# Patient Record
Sex: MALE | Race: WHITE | Employment: PART TIME | URBAN - METROPOLITAN AREA
[De-identification: names, ages, dates, MRNs, and addresses within clinical notes are randomized per-mention and may not be internally consistent; named-entity substitution may affect disease eponyms.]

---

## 2017-12-24 ENCOUNTER — OFFICE VISIT (OUTPATIENT)
Dept: URGENT CARE | Facility: URGENT CARE | Age: 37
End: 2017-12-24
Payer: COMMERCIAL

## 2017-12-24 VITALS
TEMPERATURE: 98.6 F | DIASTOLIC BLOOD PRESSURE: 96 MMHG | WEIGHT: 168 LBS | RESPIRATION RATE: 16 BRPM | HEART RATE: 80 BPM | BODY MASS INDEX: 27.12 KG/M2 | SYSTOLIC BLOOD PRESSURE: 170 MMHG

## 2017-12-24 DIAGNOSIS — I10 ESSENTIAL HYPERTENSION: ICD-10-CM

## 2017-12-24 DIAGNOSIS — B36.0 TINEA VERSICOLOR: ICD-10-CM

## 2017-12-24 DIAGNOSIS — R19.7 DIARRHEA OF PRESUMED INFECTIOUS ORIGIN: Primary | ICD-10-CM

## 2017-12-24 PROCEDURE — 99214 OFFICE O/P EST MOD 30 MIN: CPT | Performed by: INTERNAL MEDICINE

## 2017-12-24 RX ORDER — KETOCONAZOLE 20 MG/ML
SHAMPOO TOPICAL
Qty: 120 ML | Refills: 0 | Status: SHIPPED | OUTPATIENT
Start: 2017-12-24 | End: 2018-08-01

## 2017-12-24 RX ORDER — LISINOPRIL/HYDROCHLOROTHIAZIDE 10-12.5 MG
1 TABLET ORAL EVERY MORNING
Qty: 30 TABLET | Refills: 0 | Status: SHIPPED | OUTPATIENT
Start: 2017-12-24 | End: 2018-09-04 | Stop reason: DRUGHIGH

## 2017-12-24 ASSESSMENT — ENCOUNTER SYMPTOMS
DIARRHEA: 1
VOMITING: 0
FEVER: 0
DIZZINESS: 0
BLOOD IN STOOL: 0
HEADACHES: 0
FOCAL WEAKNESS: 0
MYALGIAS: 0

## 2017-12-24 NOTE — PATIENT INSTRUCTIONS
"Fluids  Easy to digest foods, soups,  Expect symptoms resolve in 1 week    blood pressure very high  Please restart blood pressure pill  Refill done 1 month  Low caffeine & Low salt diet  Walk for exercise    Recheck blood pressure in few weeks with primary   * FOOD POISONING or VIRAL GASTROENTERITIS (6yr-Adult)  FOOD POISONING may occur from 6 to 24 hours after eating food that has spoiled and lasts up to1-2 days. VIRAL GASTRO-ENTERITIS is commonly known as the \"stomach flu\" and may last 2-7 days. Symptoms of both illnesses may include vomiting, diarrhea, fever, abdominal cramping. Antibiotics are not effective, but simple home treatment will be helpful.  HOME CARE:    If symptoms are severe, rest at home for the next 24 hours.    Avoid tobacco and alcohol. These may worsen your symptoms.    If medicines for diarrhea (low dose of Immodium: one tablet a day for an adult) or vomiting were prescribed, take only as directed.   During the first 12 to 24 hours follow the diet below:    DRINKS: Sport drinks like Gatorade, soft drinks without caffeine; ginger ale, mineral water (plain or flavored), decaffeinated tea and coffee.    SOUPS: Clear broth, consommé and bouillon    DESSERTS: Plain gelatin (Jell-O), popsicles and fruit juice bars.  During the next 24 hours you may add the following to the above:    Hot cereal, plain toast, bread, rolls, crackers    Plain noodles, rice, mashed potatoes, chicken noodle or rice soup    Unsweetened canned fruit (avoid pineapple), bananas    Limit fat intake to less than 15 grams per day by avoiding margarine, butter, oils, mayonnaise, sauces, gravies, fried foods, peanut butter, meat, poultry and fish.    Limit fiber; avoid raw or cooked vegetables, fresh fruits (except bananas) and bran cereals.    Limit caffeine and chocolate. No spices or seasonings except salt.  Slowly go back to a normal diet as you feel better and your symptoms lessen.  FOLLOW UP with your doctor as advised if " you are not better in 2 days. If a stool (diarrhea) sample was taken, you may call in 2 days (or as directed) for the results.  GET PROMPT MEDICAL ATTENTION if any of the following occur:    Increasing abdominal pain or constant pain in one spot    Continued vomiting (unable to keep liquids down)    Frequent diarrhea (more than 5 times a day)    Blood in vomit or stool (black or red color)    Unable to take in fluids at all    No urine output for 12 hours or extreme thirst    Weakness, dizziness, fainting    Drowsiness, confusion, stiff neck or seizure    Fever over 101.0  F (38.3  C) for more than 3 days    New rash    0496-7079 [employer]. [address] [place] [address], [place] [address]. All rights reserved. This information is not intended as a substitute for professional medical care. Always follow your healthcare professional's instructions.  This information has been modified by your health care provider with permission from the publisher.      Tinea Versicolor  This is a rash caused by a fungus in the top layers of the skin. This fungus is normally present in the pores of the skin and causes no symptoms. But when the fungus overgrows it causes a rash. The fungus grows more easily in hot climates, and on oily or sweaty skin. Health experts don t know why some people get this rash and others don t. Experts also don t know why the rash will suddenly appear in someone who has never had it before.  The rash is made up of irregular pale or tan spots and patches. The rash is usually on the neck, upper back, chest, and shoulders. You may have mild itching, especially if you become overheated. But it doesn't cause other symptoms. Because these spots don't change color with sun exposure like normal skin, the rash may be lighter or darker than your normal skin.  This rash is harmless and usually causes no symptoms. The only reason for treatment is to improve appearance. Follow the advice below to clear the  rash. It might take several months for normal skin color to return.  Home care    Use a medicated dandruff shampoo over your whole body while in the shower. Don t use soap. Let the shampoo stay on for at least a few minutes before rinsing off. Do this every day for 4 weeks.    As a different treatment, you may buy an antifungal cream (miconazole or clotrimazole, both available without a prescription). Use this 2 times a day for 7 days.     This rash is not contagious to others. It can t be spread if someone touches it. So you don t have to worry about exposing others at school, , or work.  Prevention  This fungus can come back again (recur) after treatment. To prevent return of the rash, use medicated dandruff shampoo over your whole body when in the shower. Do this once a month for the next year. This is very important to do in the summertime. That is when the rash is most likely to recur.  Other prevention tips include:    Avoid oily skin products    Wear loose clothing. Try to let your skin stay cool and breathe.    Use sunscreen and protect yourself from sunlight    Avoid tanning beds  Follow-up care  Follow up with your healthcare provider, or as advised. Call your provider if the rash doesn t get better with the above treatment, or if new symptoms appear.  When to seek medical advice  Call your healthcare provider right away if any of these occur:    Increasing redness of the rash    Change in appearance of the rash    Fever of 100.4 F (38 C) or higher, or as directed by your provider  Date Last Reviewed: 8/1/2016 2000-2017 [employer]. [address] [place] [address], [place] [address]. All rights reserved. This information is not intended as a substitute for professional medical care. Always follow your healthcare professional's instructions.

## 2017-12-24 NOTE — NURSING NOTE
"Chief Complaint   Patient presents with     Abdominal Pain     states abdominal discomfort and small amt of diarrhea       Initial BP (!) 170/96 (Cuff Size: Adult Regular)  Pulse 80  Temp 98.6  F (37  C) (Oral)  Resp 16  Wt 168 lb (76.2 kg)  BMI 27.12 kg/m2 Estimated body mass index is 27.12 kg/(m^2) as calculated from the following:    Height as of 1/26/15: 5' 6\" (1.676 m).    Weight as of this encounter: 168 lb (76.2 kg).  Medication Reconciliation: complete Cooper FLETCHER    "

## 2017-12-24 NOTE — MR AVS SNAPSHOT
"              After Visit Summary   12/24/2017    Philipp Gupta    MRN: [id number]           Patient Information     Date Of Birth          1980        Visit Information        Provider Department      12/24/2017 10:30 AM Stacia Garnett MD [facility]        Today's Diagnoses     Diarrhea of presumed infectious origin    -  1    Essential hypertension        Tinea versicolor          Care Instructions    Fluids  Easy to digest foods, soups,  Expect symptoms resolve in 1 week    blood pressure very high  Please restart blood pressure pill  Refill done 1 month  Low caffeine & Low salt diet  Walk for exercise    Recheck blood pressure in few weeks with primary   * FOOD POISONING or VIRAL GASTROENTERITIS (6yr-Adult)  FOOD POISONING may occur from 6 to 24 hours after eating food that has spoiled and lasts up to1-2 days. VIRAL GASTRO-ENTERITIS is commonly known as the \"stomach flu\" and may last 2-7 days. Symptoms of both illnesses may include vomiting, diarrhea, fever, abdominal cramping. Antibiotics are not effective, but simple home treatment will be helpful.  HOME CARE:    If symptoms are severe, rest at home for the next 24 hours.    Avoid tobacco and alcohol. These may worsen your symptoms.    If medicines for diarrhea (low dose of Immodium: one tablet a day for an adult) or vomiting were prescribed, take only as directed.   During the first 12 to 24 hours follow the diet below:    DRINKS: Sport drinks like Gatorade, soft drinks without caffeine; ginger ale, mineral water (plain or flavored), decaffeinated tea and coffee.    SOUPS: Clear broth, consommé and bouillon    DESSERTS: Plain gelatin (Jell-O), popsicles and fruit juice bars.  During the next 24 hours you may add the following to the above:    Hot cereal, plain toast, bread, rolls, crackers    Plain noodles, rice, mashed potatoes, chicken noodle or rice soup    Unsweetened canned fruit (avoid pineapple), " bananas    Limit fat intake to less than 15 grams per day by avoiding margarine, butter, oils, mayonnaise, sauces, gravies, fried foods, peanut butter, meat, poultry and fish.    Limit fiber; avoid raw or cooked vegetables, fresh fruits (except bananas) and bran cereals.    Limit caffeine and chocolate. No spices or seasonings except salt.  Slowly go back to a normal diet as you feel better and your symptoms lessen.  FOLLOW UP with your doctor as advised if you are not better in 2 days. If a stool (diarrhea) sample was taken, you may call in 2 days (or as directed) for the results.  GET PROMPT MEDICAL ATTENTION if any of the following occur:    Increasing abdominal pain or constant pain in one spot    Continued vomiting (unable to keep liquids down)    Frequent diarrhea (more than 5 times a day)    Blood in vomit or stool (black or red color)    Unable to take in fluids at all    No urine output for 12 hours or extreme thirst    Weakness, dizziness, fainting    Drowsiness, confusion, stiff neck or seizure    Fever over 101.0  F (38.3  C) for more than 3 days    New rash    3597-1048 [employer]. [address] [place] [address]. All rights reserved. This information is not intended as a substitute for professional medical care. Always follow your healthcare professional's instructions.  This information has been modified by your health care provider with permission from the publisher.      Tinea Versicolor  This is a rash caused by a fungus in the top layers of the skin. This fungus is normally present in the pores of the skin and causes no symptoms. But when the fungus overgrows it causes a rash. The fungus grows more easily in hot climates, and on oily or sweaty skin. Health experts don t know why some people get this rash and others don t. Experts also don t know why the rash will suddenly appear in someone who has never had it before.  The rash is made up of irregular pale or tan spots and  patches. The rash is usually on the neck, upper back, chest, and shoulders. You may have mild itching, especially if you become overheated. But it doesn't cause other symptoms. Because these spots don't change color with sun exposure like normal skin, the rash may be lighter or darker than your normal skin.  This rash is harmless and usually causes no symptoms. The only reason for treatment is to improve appearance. Follow the advice below to clear the rash. It might take several months for normal skin color to return.  Home care    Use a medicated dandruff shampoo over your whole body while in the shower. Don t use soap. Let the shampoo stay on for at least a few minutes before rinsing off. Do this every day for 4 weeks.    As a different treatment, you may buy an antifungal cream (miconazole or clotrimazole, both available without a prescription). Use this 2 times a day for 7 days.     This rash is not contagious to others. It can t be spread if someone touches it. So you don t have to worry about exposing others at school, , or work.  Prevention  This fungus can come back again (recur) after treatment. To prevent return of the rash, use medicated dandruff shampoo over your whole body when in the shower. Do this once a month for the next year. This is very important to do in the summertime. That is when the rash is most likely to recur.  Other prevention tips include:    Avoid oily skin products    Wear loose clothing. Try to let your skin stay cool and breathe.    Use sunscreen and protect yourself from sunlight    Avoid tanning beds  Follow-up care  Follow up with your healthcare provider, or as advised. Call your provider if the rash doesn t get better with the above treatment, or if new symptoms appear.  When to seek medical advice  Call your healthcare provider right away if any of these occur:    Increasing redness of the rash    Change in appearance of the rash    Fever of 100.4 F (38 C) or higher,  "or as directed by your provider  Date Last Reviewed: 2016-2017 [employer]. [address] [place] [address], [place] [address]. All rights reserved. This information is not intended as a substitute for professional medical care. Always follow your healthcare professional's instructions.                Follow-ups after your visit        Who to contact     If you have questions or need follow up information about today's clinic visit or your schedule please contact [facility] directly at [phone].  Normal or non-critical lab and imaging results will be communicated to you by [employer], letter or phone within 4 business days after the clinic has received the results. If you do not hear from us within 7 days, please contact the clinic through [employer] or phone. If you have a critical or abnormal lab result, we will notify you by phone as soon as possible.  Submit refill requests through [employer] or call your pharmacy and they will forward the refill request to us. Please allow 3 business days for your refill to be completed.          Additional Information About Your Visit        [employer] Information     [employer] lets you send messages to your doctor, view your test results, renew your prescriptions, schedule appointments and more. To sign up, go to [email or web address] . Click on \"Log in\" on the left side of the screen, which will take you to the Welcome page. Then click on \"Sign up Now\" on the right side of the page.     You will be asked to enter the access code listed below, as well as some personal information. Please follow the directions to create your username and password.     Your access code is: [email or web address]  Expires: 3/24/2018 12:49 PM     Your access code will  in 90 days. If you need help or a new code, please call your [facility] clinic or [phone].        Care EveryWhere ID     This is your Care EveryWhere ID. This could be used by other " organizations to access your [facility] medical records  [id number]        Your Vitals Were     Pulse Temperature Respirations BMI (Body Mass Index)          80 98.6  F (37  C) (Oral) 16 27.12 kg/m2         Blood Pressure from Last 3 Encounters:   12/24/17 (!) 170/96   11/17/16 (!) 158/104   01/26/15 132/88    Weight from Last 3 Encounters:   12/24/17 168 lb (76.2 kg)   11/17/16 168 lb (76.2 kg)   02/24/14 159 lb (72.1 kg)              Today, you had the following     No orders found for display         Today's Medication Changes          These changes are accurate as of: 12/24/17 12:54 PM.  If you have any questions, ask your nurse or doctor.               Start taking these medicines.        Dose/Directions    ketoconazole 2 % shampoo   Commonly known as:  NIZORAL   Used for:  Tinea versicolor   Started by:  Stacia Garnett MD        Apply to the affected area and wash off after 5 -10 minutes.   Quantity:  120 mL   Refills:  0            Where to get your medicines      These medications were sent to [facility] [address] [address]     Phone:  [phone]     ketoconazole 2 % shampoo    lisinopril-hydrochlorothiazide 10-12.5 MG per tablet                Primary Care Provider Office Phone # Fax #    Corey Cosme [phone]       XXX RESIGNED  E NICOLLET [facility] [address]  [facility] [address]        Equal Access to Services     RENITA VERA : Hadii adam ku hadasho Soomaali, waaxda luqadaha, qaybta kaalmada adeegyaseverino, renato idiin hayaan adeeg kharash la'aan . So [facility] [phone].    ATENCIÓN: Si habla español, tiene a murphy disposición servicios gratuitos de asistencia lingüística. Llame al [phone].    We comply with applicable federal civil rights laws and [place] laws. We do not discriminate on the basis of race, color, national origin, age, disability, sex, sexual orientation, or gender identity.             Thank you!     Thank you for choosing [facility]  for your care. Our goal is always to provide you with excellent care. Hearing back from our patients is one way we can continue to improve our services. Please take a few minutes to complete the written survey that you may receive in the mail after your visit with us. Thank you!             Your Updated Medication List - Protect others around you: Learn how to safely use, store and throw away your medicines at www.disposemymeds.org.          This list is accurate as of: 12/24/17 12:54 PM.  Always use your most recent med list.                   Brand Name Dispense Instructions for use Diagnosis    ketoconazole 2 % shampoo    NIZORAL    120 mL    Apply to the affected area and wash off after 5 -10 minutes.    Tinea versicolor       lisinopril-hydrochlorothiazide 10-12.5 MG per tablet    PRINZIDE/ZESTORETIC    30 tablet    Take 1 tablet by mouth every morning    Essential hypertension       order for DME     1 each    Equipment being ordered: Heel support, gel, lateral spot, medium    Plantar fascial fibromatosis, Pes cavus

## 2017-12-24 NOTE — PROGRESS NOTES
SUBJECTIVE:   Philipp Gupta is a 37 year old male presenting with a chief complaint of   Chief Complaint   Patient presents with     Abdominal Pain     states abdominal discomfort and small amt of diarrhea   .  started yesterday  May be related to eating food yesterday at restaurant  No contagious contacts  No recent travel or antibiotics     abd making sounds  No abd pain    Loose watery stools  1-2 day.    Review of Systems   Constitutional: Negative for fever.   Cardiovascular: Negative for chest pain.   Gastrointestinal: Positive for diarrhea. Negative for blood in stool, melena and vomiting.   Musculoskeletal: Negative for myalgias.   Neurological: Negative for dizziness, focal weakness and headaches.     Also, he has additional complaints of rash on chest/fungus.     No past medical history on file.  Current Outpatient Prescriptions   Medication Sig Dispense Refill     lisinopril-hydrochlorothiazide (PRINZIDE/ZESTORETIC) 10-12.5 MG per tablet Take 1 tablet by mouth every morning 30 tablet 0     ketoconazole (NIZORAL) 2 % shampoo Apply to the affected area and wash off after 5 -10 minutes. 120 mL 0     ORDER FOR DME Equipment being ordered: Heel support, gel, lateral spot, medium 1 each 0     [DISCONTINUED] lisinopril-hydrochlorothiazide (PRINZIDE,ZESTORETIC) 10-12.5 MG per tablet Take 1 tablet by mouth every morning. 90 tablet 3     Social History   Substance Use Topics     Smoking status: Never Smoker     Smokeless tobacco: Never Used     Alcohol use No       OBJECTIVE  BP (!) 170/96 (Cuff Size: Adult Regular)  Pulse 80  Temp 98.6  F (37  C) (Oral)  Resp 16  Wt 168 lb (76.2 kg)  BMI 27.12 kg/m2    Physical Exam   Constitutional: He is well-developed, well-nourished, and in no distress.   Cardiovascular: Normal rate and regular rhythm.    Pulmonary/Chest: Effort normal and breath sounds normal.   Abdominal: Soft. Bowel sounds are normal. There is no tenderness. There is no rebound and no guarding.  "  Skin:   Circular/coalescing Hypopigmented areas on chest & back       Labs:  No results found for this or any previous visit (from the past 24 hour(s)).    ASSESSMENT:      ICD-10-CM    1. Diarrhea of presumed infectious origin A09    2. Essential hypertension I10 lisinopril-hydrochlorothiazide (PRINZIDE/ZESTORETIC) 10-12.5 MG per tablet   3. Tinea versicolor B36.0 ketoconazole (NIZORAL) 2 % shampoo        Medical Decision Making:      Patient Instructions   Fluids  Easy to digest foods, soups,  Expect symptoms resolve in 1 week    blood pressure very high  Please restart blood pressure pill  Refill done 1 month  Low caffeine & Low salt diet  Walk for exercise    Recheck blood pressure in few weeks with primary   * FOOD POISONING or VIRAL GASTROENTERITIS (6yr-Adult)  FOOD POISONING may occur from 6 to 24 hours after eating food that has spoiled and lasts up to1-2 days. VIRAL GASTRO-ENTERITIS is commonly known as the \"stomach flu\" and may last 2-7 days. Symptoms of both illnesses may include vomiting, diarrhea, fever, abdominal cramping. Antibiotics are not effective, but simple home treatment will be helpful.  HOME CARE:    If symptoms are severe, rest at home for the next 24 hours.    Avoid tobacco and alcohol. These may worsen your symptoms.    If medicines for diarrhea (low dose of Immodium: one tablet a day for an adult) or vomiting were prescribed, take only as directed.   During the first 12 to 24 hours follow the diet below:    DRINKS: Sport drinks like Gatorade, soft drinks without caffeine; ginger ale, mineral water (plain or flavored), decaffeinated tea and coffee.    SOUPS: Clear broth, consommé and bouillon    DESSERTS: Plain gelatin (Jell-O), popsicles and fruit juice bars.  During the next 24 hours you may add the following to the above:    Hot cereal, plain toast, bread, rolls, crackers    Plain noodles, rice, mashed potatoes, chicken noodle or rice soup    Unsweetened canned fruit (avoid " pineapple), bananas    Limit fat intake to less than 15 grams per day by avoiding margarine, butter, oils, mayonnaise, sauces, gravies, fried foods, peanut butter, meat, poultry and fish.    Limit fiber; avoid raw or cooked vegetables, fresh fruits (except bananas) and bran cereals.    Limit caffeine and chocolate. No spices or seasonings except salt.  Slowly go back to a normal diet as you feel better and your symptoms lessen.  FOLLOW UP with your doctor as advised if you are not better in 2 days. If a stool (diarrhea) sample was taken, you may call in 2 days (or as directed) for the results.  GET PROMPT MEDICAL ATTENTION if any of the following occur:    Increasing abdominal pain or constant pain in one spot    Continued vomiting (unable to keep liquids down)    Frequent diarrhea (more than 5 times a day)    Blood in vomit or stool (black or red color)    Unable to take in fluids at all    No urine output for 12 hours or extreme thirst    Weakness, dizziness, fainting    Drowsiness, confusion, stiff neck or seizure    Fever over 101.0  F (38.3  C) for more than 3 days    New rash    3833-1954 [employer]. [address] [place] [address]. All rights reserved. This information is not intended as a substitute for professional medical care. Always follow your healthcare professional's instructions.  This information has been modified by your health care provider with permission from the publisher.      Tinea Versicolor  This is a rash caused by a fungus in the top layers of the skin. This fungus is normally present in the pores of the skin and causes no symptoms. But when the fungus overgrows it causes a rash. The fungus grows more easily in hot climates, and on oily or sweaty skin. Health experts don t know why some people get this rash and others don t. Experts also don t know why the rash will suddenly appear in someone who has never had it before.  The rash is made up of irregular pale or  tan spots and patches. The rash is usually on the neck, upper back, chest, and shoulders. You may have mild itching, especially if you become overheated. But it doesn't cause other symptoms. Because these spots don't change color with sun exposure like normal skin, the rash may be lighter or darker than your normal skin.  This rash is harmless and usually causes no symptoms. The only reason for treatment is to improve appearance. Follow the advice below to clear the rash. It might take several months for normal skin color to return.  Home care    Use a medicated dandruff shampoo over your whole body while in the shower. Don t use soap. Let the shampoo stay on for at least a few minutes before rinsing off. Do this every day for 4 weeks.    As a different treatment, you may buy an antifungal cream (miconazole or clotrimazole, both available without a prescription). Use this 2 times a day for 7 days.     This rash is not contagious to others. It can t be spread if someone touches it. So you don t have to worry about exposing others at school, , or work.  Prevention  This fungus can come back again (recur) after treatment. To prevent return of the rash, use medicated dandruff shampoo over your whole body when in the shower. Do this once a month for the next year. This is very important to do in the summertime. That is when the rash is most likely to recur.  Other prevention tips include:    Avoid oily skin products    Wear loose clothing. Try to let your skin stay cool and breathe.    Use sunscreen and protect yourself from sunlight    Avoid tanning beds  Follow-up care  Follow up with your healthcare provider, or as advised. Call your provider if the rash doesn t get better with the above treatment, or if new symptoms appear.  When to seek medical advice  Call your healthcare provider right away if any of these occur:    Increasing redness of the rash    Change in appearance of the rash    Fever of 100.4 F  (38 C) or higher, or as directed by your provider  Date Last Reviewed: 8/1/2016 2000-2017 [employer]. [address] [place] [address], [place] [address]. All rights reserved. This information is not intended as a substitute for professional medical care. Always follow your healthcare professional's instructions.

## 2018-08-01 ENCOUNTER — HOSPITAL ENCOUNTER (EMERGENCY)
Facility: CLINIC | Age: 38
Discharge: HOME | End: 2018-08-01
Attending: EMERGENCY MEDICINE | Admitting: EMERGENCY MEDICINE
Payer: OTHER MISCELLANEOUS

## 2018-08-01 ENCOUNTER — APPOINTMENT (OUTPATIENT)
Dept: GENERAL RADIOLOGY | Facility: CLINIC | Age: 38
End: 2018-08-01
Attending: EMERGENCY MEDICINE
Payer: OTHER MISCELLANEOUS

## 2018-08-01 VITALS
BODY MASS INDEX: 25.82 KG/M2 | DIASTOLIC BLOOD PRESSURE: 101 MMHG | WEIGHT: 160 LBS | SYSTOLIC BLOOD PRESSURE: 187 MMHG | TEMPERATURE: 98 F | RESPIRATION RATE: 18 BRPM | HEART RATE: 89 BPM | OXYGEN SATURATION: 99 %

## 2018-08-01 DIAGNOSIS — M79.641 PAIN OF RIGHT HAND: ICD-10-CM

## 2018-08-01 PROCEDURE — 73130 X-RAY EXAM OF HAND: CPT | Mod: RT

## 2018-08-01 PROCEDURE — 25000132 ZZH RX MED GY IP 250 OP 250 PS 637: Performed by: EMERGENCY MEDICINE

## 2018-08-01 PROCEDURE — 99283 EMERGENCY DEPT VISIT LOW MDM: CPT

## 2018-08-01 RX ORDER — GINSENG 100 MG
CAPSULE ORAL
Status: DISCONTINUED
Start: 2018-08-01 | End: 2018-08-01 | Stop reason: HOSPADM

## 2018-08-01 RX ORDER — IBUPROFEN 600 MG/1
600 TABLET, FILM COATED ORAL ONCE
Status: COMPLETED | OUTPATIENT
Start: 2018-08-01 | End: 2018-08-01

## 2018-08-01 RX ADMIN — IBUPROFEN 600 MG: 600 TABLET ORAL at 20:10

## 2018-08-01 ASSESSMENT — ENCOUNTER SYMPTOMS: WOUND: 0

## 2018-08-01 NOTE — ED AVS SNAPSHOT
[facility] Emergency Department    [address]    [facility] [address]    Phone:  [phone]    Fax:  [phone]                                       Philipp Gupta   MRN: [id number]    Department:  [facility] Emergency Department   Date of Visit:  8/1/2018           After Visit Summary Signature Page     I have received my discharge instructions, and my questions have been answered. I have discussed any challenges I see with this plan with the nurse or doctor.    ..........................................................................................................................................  Patient/Patient Representative Signature      ..........................................................................................................................................  Patient Representative Print Name and Relationship to Patient    ..................................................               ................................................  Date                                            Time    ..........................................................................................................................................  Reviewed by Signature/Title    ...................................................              ..............................................  Date                                                            Time

## 2018-08-01 NOTE — ED AVS SNAPSHOT
[facility] Emergency Department    [address] [place] [address]    Phone:  [phone]    Fax:  [phone]                                       Philipp Gupta   MRN: [id number]    Department:  [facility] Emergency Department   Date of Visit:  8/1/2018           Patient Information     Date Of Birth          1980        Your diagnoses for this visit were:     Pain of right hand        You were seen by Phi Tapia MD.        Discharge Instructions         Please make an appointment to follow up with your primary care provider in 7 days for recheck of your blood pressure.    Discharge Instructions  Hypertension - High Blood Pressure    During you visit to the Emergency Department, your blood pressure was higher than the recommended blood pressure.  This may be related to stress, pain, medication or other temporary conditions. In these cases, your blood pressure may return to normal on its own. If you have a history of high blood pressure, you may need to have your provider adjust your medications. Sometimes, your high measurement here may indicate that you have developed high blood pressure that will stay high unless it is treated. As a general rule, high blood pressure causes problems over years rather than days, weeks, or months. So, while it is important to treat blood pressure, it is rarely important to treat blood pressure immediately. Occasionally we will begin a medication in the Emergency Department; more often we will recommend close follow-up for medications with a primary doctor/clinic.    Generally, every Emergency Department visit should have a follow-up clinic visit with either a primary or a specialty clinic/provider. Please follow-up as instructed by your emergency provider today.    Return to the Emergency Department if you start to have:    A severe headache.    Chest pain.    Shortness of breath.    Weakness or numbness that affects  one part of the body.    Confusion.    Vision changes.    Significant swelling of legs and/or eyes.    A reaction to any medication started in the Emergency Department.    What can I do to help myself?    Avoid alcohol.    Take any blood pressure medicine that you are prescribed.    Get a good night s sleep.    Lower your salt intake.    Exercise.    Lose weight.    Manage stress.    See your doctor regularly    If blood pressure medication was started in the Emergency Department:    The medicine may not have an immediate effect. The body and brain determine what blood pressure you have. The medicine s job is to retrain the body s  thermostat  to a lower blood pressure.    You will need to follow up with your provider to see how this medicine is working for you.  If you were given a prescription for medicine here today, be sure to read all of the information (including the package insert) that comes with your prescription.  This will include important information about the medicine, its side effects, and any warnings that you need to know about.  The pharmacist who fills the prescription can provide more information and answer questions you may have about the medicine.  If you have questions or concerns that the pharmacist cannot address, please call or return to the Emergency Department.   Remember that you can always come back to the Emergency Department if you are not able to see your regular provider in the amount of time listed above, if you get any new symptoms, or if there is anything that worries you.      Discharge References/Attachments     HAND CONTUSION (ENGLISH)      24 Hour Appointment Hotline       To make an appointment at any [facility], call [phone] ([phone]). If you don't have a family doctor or clinic, we will help you find one. [facility] are conveniently located to serve the needs of you and your family.             Review of your medicines      Our records show that  you are taking the medicines listed below. If these are incorrect, please call your family doctor or clinic.        Dose / Directions Last dose taken    lisinopril-hydrochlorothiazide 10-12.5 MG per tablet   Commonly known as:  PRINZIDE/ZESTORETIC   Dose:  1 tablet   Quantity:  30 tablet        Take 1 tablet by mouth every morning   Refills:  0        order for DME   Quantity:  1 each        Equipment being ordered: Heel support, gel, lateral spot, medium   Refills:  0                Procedures and tests performed during your visit     XR Hand Right G/E 3 Views      Orders Needing Specimen Collection     None      Pending Results     No orders found from 7/30/2018 to 8/2/2018.            Pending Culture Results     No orders found from 7/30/2018 to 8/2/2018.            Pending Results Instructions     If you had any lab results that were not finalized at the time of your Discharge, you can call the ED Lab Result RN at [phone]. You will be contacted by this team for any positive Lab results or changes in treatment. The nurses are available 7 days a week from 10A to 6:30P.  You can leave a message 24 hours per day and they will return your call.        Test Results From Your Hospital Stay        8/1/2018  8:35 PM      Narrative     HAND THREE  VIEWS RIGHT 8/1/2018 8:17 PM     HISTORY: traumatic 1st metacarpal pain; Possible puncture wound - rule  out FB;     COMPARISON: None.        Impression     IMPRESSION: No acute fracture, dislocation or radiopaque foreign body.    BRADLEY ABRAMS MD                Clinical Quality Measure: Blood Pressure Screening     Your blood pressure was checked while you were in the emergency department today. The last reading we obtained was  BP: (!) 187/101 . Please read the guidelines below about what these numbers mean and what you should do about them.  If your systolic blood pressure (the top number) is less than 120 and your diastolic blood pressure (the bottom number) is less  "than 80, then your blood pressure is normal. There is nothing more that you need to do about it.  If your systolic blood pressure (the top number) is 120-139 or your diastolic blood pressure (the bottom number) is 80-89, your blood pressure may be higher than it should be. You should have your blood pressure rechecked within a year by a primary care provider.  If your systolic blood pressure (the top number) is 140 or greater or your diastolic blood pressure (the bottom number) is 90 or greater, you may have high blood pressure. High blood pressure is treatable, but if left untreated over time it can put you at risk for heart attack, stroke, or kidney failure. You should have your blood pressure rechecked by a primary care provider within the next 4 weeks.  If your provider in the emergency department today gave you specific instructions to follow-up with your doctor or provider even sooner than that, you should follow that instruction and not wait for up to 4 weeks for your follow-up visit.        Thank you for choosing [facility]       Thank you for choosing [facility] for your care. Our goal is always to provide you with excellent care. Hearing back from our patients is one way we can continue to improve our services. Please take a few minutes to complete the written survey that you may receive in the mail after you visit with us. Thank you!        [employer] Information     [employer] lets you send messages to your doctor, view your test results, renew your prescriptions, schedule appointments and more. To sign up, go to [email or web address] . Click on \"Log in\" on the left side of the screen, which will take you to the Welcome page. Then click on \"Sign up Now\" on the right side of the page.     You will be asked to enter the access code listed below, as well as some personal information. Please follow the directions to create your username and password.     Your access code is: [email or web address]  Expires: 10/30/2018  8:44 " PM     Your access code will  in 90 days. If you need help or a new code, please call your [facility] clinic or [phone].        Care EveryWhere ID     This is your Care EveryWhere ID. This could be used by other organizations to access your [facility] medical records  [id number]        Equal Access to Services     BRAEDEN EVRA : Bruno Flowers, waaxda angeladaha, qaybta kaalnaveed coreas, renato pride. So [facility] [phone].    ATENCIÓN: Si habla español, tiene a murphy disposición servicios gratuitos de asistencia lingüística. Llame al [phone].    We comply with applicable federal civil rights laws and [place] laws. We do not discriminate on the basis of race, color, national origin, age, disability, sex, sexual orientation, or gender identity.            After Visit Summary       This is your record. Keep this with you and show to your community pharmacist(s) and doctor(s) at your next visit.

## 2018-08-02 NOTE — DISCHARGE INSTRUCTIONS
Please make an appointment to follow up with your primary care provider in 7 days for recheck of your blood pressure.    Discharge Instructions  Hypertension - High Blood Pressure    During you visit to the Emergency Department, your blood pressure was higher than the recommended blood pressure.  This may be related to stress, pain, medication or other temporary conditions. In these cases, your blood pressure may return to normal on its own. If you have a history of high blood pressure, you may need to have your provider adjust your medications. Sometimes, your high measurement here may indicate that you have developed high blood pressure that will stay high unless it is treated. As a general rule, high blood pressure causes problems over years rather than days, weeks, or months. So, while it is important to treat blood pressure, it is rarely important to treat blood pressure immediately. Occasionally we will begin a medication in the Emergency Department; more often we will recommend close follow-up for medications with a primary doctor/clinic.    Generally, every Emergency Department visit should have a follow-up clinic visit with either a primary or a specialty clinic/provider. Please follow-up as instructed by your emergency provider today.    Return to the Emergency Department if you start to have:    A severe headache.    Chest pain.    Shortness of breath.    Weakness or numbness that affects one part of the body.    Confusion.    Vision changes.    Significant swelling of legs and/or eyes.    A reaction to any medication started in the Emergency Department.    What can I do to help myself?    Avoid alcohol.    Take any blood pressure medicine that you are prescribed.    Get a good night s sleep.    Lower your salt intake.    Exercise.    Lose weight.    Manage stress.    See your doctor regularly    If blood pressure medication was started in the Emergency Department:    The medicine may not have an  immediate effect. The body and brain determine what blood pressure you have. The medicine s job is to retrain the body s  thermostat  to a lower blood pressure.    You will need to follow up with your provider to see how this medicine is working for you.  If you were given a prescription for medicine here today, be sure to read all of the information (including the package insert) that comes with your prescription.  This will include important information about the medicine, its side effects, and any warnings that you need to know about.  The pharmacist who fills the prescription can provide more information and answer questions you may have about the medicine.  If you have questions or concerns that the pharmacist cannot address, please call or return to the Emergency Department.   Remember that you can always come back to the Emergency Department if you are not able to see your regular provider in the amount of time listed above, if you get any new symptoms, or if there is anything that worries you.

## 2018-08-02 NOTE — ED PROVIDER NOTES
History     Chief Complaint:  Hand pain    HPI   Philipp Gupta is a 38 year old male who presents with right hand pain. The patient was working as a  here in the [facility] when he grabbed a trash bag and immediatly felt a sharp pain in his right palm at the thumb. The pain has persisted and he is concerned he may have accidentally grabbed a piece of glass or needle. He has no wound and was not bleeding at any point. The pain does not radiate to any other part of his hand or wrist. He has not taken anything for the pain.    Allergies:  No known allergies     Medications:    Prinizide     Past Medical History:    Hypertension    Past Surgical History:    The patient does not have any pertinent past surgical history.    Family History:    No past pertinent family history.    Social History:  Patient presents alone  Negative for tobacco use.  Negative for alcohol use.  Marital Status:  Single     Review of Systems   Musculoskeletal:        Right palm pain   Skin: Negative for wound.   All other systems reviewed and are negative.      Physical Exam   First Vitals:  BP: (!) 187/101  Pulse: 89  Heart Rate: 89  Temp: 98  F (36.7  C)  Resp: 18  Weight: 72.6 kg (160 lb)  SpO2: 99 %      Physical Exam    General:   Pleasant, age appropriate.  EYES:   Conjunctiva normal.  NECK:    Supple, no meningismus.   CV:     Regular rate and rhythm     No murmurs, rubs or gallops.       2+ radial pulses bilateral.  PULM:    Clear to auscultation bilateral.       No respiratory distress.      No wheezing, rales or stridor.  MSK:     Right upper extremity:      No pain with palpation or ROM of the wrist.      No scaphoid tenderness.      Minimal tenderness over the volar aspect 1st MCP joint with mild overlying edema       No overlying laceration, puncture wound or tenting of the skin.  LYMPH:   No cervical lymphadenopathy.  NEURO:   right upper extremity:      Median, radial and ulnar nerve intact to motor and  sensation.  SKIN:    Warm, dry and intact.    PSYCH:    Mood is good and affect is appropriate.      Emergency Department Course   Imaging:  Radiographic findings were communicated with the patient who voiced understanding of the findings.  X-ray Right hand, 3 views:  No acute fracture, dislocation or radiopaque foreign body.  Result per radiology.      Interventions:   - Advil 600 mg PO   - Bacitracin ointment      Emergency Department Course:  Nursing notes and vitals reviewed.  : I performed an exam of the patient as documented above. Imaging ordered    : I rechecked the patient. Findings and plan explained to the Patient. Patient discharged home with instructions regarding supportive care, medications, and reasons to return. The importance of close follow-up was reviewed.       Impression & Plan    Medical Decision Makin-year-old male seen in the ED with acute onset of discomfort at his first MCP joint of his right hand while throwing away trash.  Plain films are without fracture or dislocation.  No signs of foreign body on plain films.  He has no signs of puncture wound, laceration or abrasion.  Nothing to suggest inadvertent needle stick injury.  Findings are consistent with soft tissue contusion alone.  Patient safe for discharge home with general wound care instructions.  Patient instructed to follow-up primary care physician for recheck this blood pressure.    Diagnosis:    ICD-10-CM    1. Pain of right hand M79.641        IDread, am serving as a scribe on 2018 at 8:00 PM to personally document services performed by Phi Tapia MD based on my observations and the provider's statements to me.       Dread Do  2018   [facility] EMERGENCY DEPARTMENT       Phi Tapia MD  [phone]

## 2018-09-04 ENCOUNTER — OFFICE VISIT (OUTPATIENT)
Dept: INTERNAL MEDICINE | Facility: CLINIC | Age: 38
End: 2018-09-04
Payer: COMMERCIAL

## 2018-09-04 VITALS
BODY MASS INDEX: 24.56 KG/M2 | RESPIRATION RATE: 16 BRPM | HEART RATE: 98 BPM | TEMPERATURE: 98 F | DIASTOLIC BLOOD PRESSURE: 90 MMHG | OXYGEN SATURATION: 98 % | HEIGHT: 66 IN | SYSTOLIC BLOOD PRESSURE: 150 MMHG | WEIGHT: 152.8 LBS

## 2018-09-04 DIAGNOSIS — I10 ESSENTIAL HYPERTENSION: Primary | ICD-10-CM

## 2018-09-04 DIAGNOSIS — Z13.220 LIPID SCREENING: ICD-10-CM

## 2018-09-04 LAB
BASOPHILS # BLD AUTO: 0 10E9/L (ref 0–0.2)
BASOPHILS NFR BLD AUTO: 0.3 %
DIFFERENTIAL METHOD BLD: ABNORMAL
EOSINOPHIL # BLD AUTO: 0.1 10E9/L (ref 0–0.7)
EOSINOPHIL NFR BLD AUTO: 0.9 %
ERYTHROCYTE [DISTWIDTH] IN BLOOD BY AUTOMATED COUNT: 13.4 % (ref 10–15)
HCT VFR BLD AUTO: 42.8 % (ref 40–53)
HGB BLD-MCNC: 15.5 G/DL (ref 13.3–17.7)
LYMPHOCYTES # BLD AUTO: 1.2 10E9/L (ref 0.8–5.3)
LYMPHOCYTES NFR BLD AUTO: 21.4 %
MCH RBC QN AUTO: 31.3 PG (ref 26.5–33)
MCHC RBC AUTO-ENTMCNC: 36.2 G/DL (ref 31.5–36.5)
MCV RBC AUTO: 86 FL (ref 78–100)
MONOCYTES # BLD AUTO: 0.4 10E9/L (ref 0–1.3)
MONOCYTES NFR BLD AUTO: 7.5 %
NEUTROPHILS # BLD AUTO: 4 10E9/L (ref 1.6–8.3)
NEUTROPHILS NFR BLD AUTO: 69.9 %
PLATELET # BLD AUTO: 116 10E9/L (ref 150–450)
RBC # BLD AUTO: 4.96 10E12/L (ref 4.4–5.9)
WBC # BLD AUTO: 5.8 10E9/L (ref 4–11)

## 2018-09-04 PROCEDURE — 80061 LIPID PANEL: CPT | Performed by: FAMILY MEDICINE

## 2018-09-04 PROCEDURE — 85025 COMPLETE CBC W/AUTO DIFF WBC: CPT | Performed by: FAMILY MEDICINE

## 2018-09-04 PROCEDURE — 99213 OFFICE O/P EST LOW 20 MIN: CPT | Performed by: FAMILY MEDICINE

## 2018-09-04 PROCEDURE — 80053 COMPREHEN METABOLIC PANEL: CPT | Performed by: FAMILY MEDICINE

## 2018-09-04 PROCEDURE — 36415 COLL VENOUS BLD VENIPUNCTURE: CPT | Performed by: FAMILY MEDICINE

## 2018-09-04 RX ORDER — LISINOPRIL AND HYDROCHLOROTHIAZIDE 12.5; 2 MG/1; MG/1
1 TABLET ORAL DAILY
Qty: 30 TABLET | Refills: 5 | Status: SHIPPED | OUTPATIENT
Start: 2018-09-04 | End: 2019-03-05

## 2018-09-04 NOTE — MR AVS SNAPSHOT
"              After Visit Summary   9/4/2018    Philipp Gupta    MRN: [id number]           Patient Information     Date Of Birth          1980        Visit Information        Provider Department      9/4/2018 9:00 AM Michael Gonzalez MD [facility]        Today's Diagnoses     Essential hypertension    -  1    Lipid screening          Care Instructions      Take prescribed medication as directed.    Re check in 5-6 months.          Follow-ups after your visit        Who to contact     If you have questions or need follow up information about today's clinic visit or your schedule please contact [facility] directly at [phone].  Normal or non-critical lab and imaging results will be communicated to you by MyChart, letter or phone within 4 business days after the clinic has received the results. If you do not hear from us within 7 days, please contact the clinic through MyChart or phone. If you have a critical or abnormal lab result, we will notify you by phone as soon as possible.  Submit refill requests through [employer] or call your pharmacy and they will forward the refill request to us. Please allow 3 business days for your refill to be completed.          Additional Information About Your Visit        Care EveryWhere ID     This is your Care EveryWhere ID. This could be used by other organizations to access your [facility] medical records  [id number]        Your Vitals Were     Pulse Temperature Respirations Height Pulse Oximetry BMI (Body Mass Index)    98 98  F (36.7  C) (Oral) 16 5' 6\" (1.676 m) 98% 24.66 kg/m2       Blood Pressure from Last 3 Encounters:   09/04/18 150/90   08/01/18 (!) 187/101   12/24/17 (!) 170/96    Weight from Last 3 Encounters:   09/04/18 152 lb 12.8 oz (69.3 kg)   08/01/18 160 lb (72.6 kg)   12/24/17 168 lb (76.2 kg)              We Performed the Following     CBC with platelets and differential     Comprehensive metabolic panel (BMP + Alb, " Alk Phos, ALT, AST, Total. Bili, TP)     Lipid Profile (Chol, Trig, HDL, LDL calc)          Today's Medication Changes          These changes are accurate as of 9/4/18  9:32 AM.  If you have any questions, ask your nurse or doctor.               Start taking these medicines.        Dose/Directions    lisinopril-hydrochlorothiazide 20-12.5 MG per tablet   Commonly known as:  PRINZIDE/ZESTORETIC   Used for:  Essential hypertension   Replaces:  lisinopril-hydrochlorothiazide 10-12.5 MG per tablet   Started by:  Michael Gonzalez MD        Dose:  1 tablet   Take 1 tablet by mouth daily   Quantity:  30 tablet   Refills:  5         Stop taking these medicines if you haven't already. Please contact your care team if you have questions.     lisinopril-hydrochlorothiazide 10-12.5 MG per tablet   Commonly known as:  PRINZIDE/ZESTORETIC   Replaced by:  lisinopril-hydrochlorothiazide 20-12.5 MG per tablet   Stopped by:  Michael Gonzalez MD                Where to get your medicines      These medications were sent to [facility] - [place], [place] - [address]  [address] [place] [address]     Phone:  [phone]     lisinopril-hydrochlorothiazide 20-12.5 MG per tablet                Primary Care Provider Office Phone # Fax #    [facility] [phone]       [address] [place] [address]        Equal Access to Services     BRAEDEN VERA AH: Hadii adam ku hadasho Soomaali, waaxda luqadaha, qaybta kaalmada adeegyada, waxay brenda liang aderaissa reis . So [facility] [phone].    ATENCIÓN: Si habla español, tiene a murphy disposición servicios gratuitos de asistencia lingüística. Cl al [phone].    We comply with applicable federal civil rights laws and [place] laws. We do not discriminate on the basis of race, color, national origin, age, disability, sex, sexual orientation, or gender identity.            Thank you!     Thank you for  choosing [facility]  for your care. Our goal is always to provide you with excellent care. Hearing back from our patients is one way we can continue to improve our services. Please take a few minutes to complete the written survey that you may receive in the mail after your visit with us. Thank you!             Your Updated Medication List - Protect others around you: Learn how to safely use, store and throw away your medicines at www.disposemymeds.org.          This list is accurate as of 9/4/18  9:32 AM.  Always use your most recent med list.                   Brand Name Dispense Instructions for use Diagnosis    lisinopril-hydrochlorothiazide 20-12.5 MG per tablet    PRINZIDE/ZESTORETIC    30 tablet    Take 1 tablet by mouth daily    Essential hypertension

## 2018-09-04 NOTE — PROGRESS NOTES
"  SUBJECTIVE:   Philipp Gupta is a 38 year old male who presents to clinic today for the following health issues:    Hypertension Follow-up      Outpatient blood pressures are not being checked.    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    Problems taking medications regularly: No    Medication side effects: none    Diet: low salt    No recent labs.    Patient Active Problem List   Diagnosis     Pain in joint, shoulder region     CARDIOVASCULAR SCREENING; LDL GOAL LESS THAN 160     Essential hypertension       REVIEW OF SYSTEMS    No HA  No SOB  No CP  No swelling  Some stiffness L shoulder      No past medical history on file.      EXAM  /90  Pulse 98  Temp 98  F (36.7  C) (Oral)  Resp 16  Ht 5' 6\" (1.676 m)  Wt 152 lb 12.8 oz (69.3 kg)  SpO2 98%  BMI 24.66 kg/m2    Thin, NAD  HEENT unremarkable  Neck: neg  Chest: cl  CV: RSR, 2/6 DAVID  Abd: neg  Extremities: sl decr abduction L shoulder      (I10) Essential hypertension  (primary encounter diagnosis)  Comment:   Slight dose adjustment.  Plan: lisinopril-hydrochlorothiazide         (PRINZIDE/ZESTORETIC) 20-12.5 MG per tablet,         CBC with platelets and differential,         Comprehensive metabolic panel (BMP + Alb, Alk         Phos, ALT, AST, Total. Bili, TP)            (Z13.220) Lipid screening  Comment:   Plan: Lipid Profile (Chol, Trig, HDL, LDL calc)              Take prescribed medication as directed.    Re check in 5-6 months.            "

## 2018-09-05 LAB
ALBUMIN SERPL-MCNC: 4.4 G/DL (ref 3.4–5)
ALP SERPL-CCNC: 59 U/L (ref 40–150)
ALT SERPL W P-5'-P-CCNC: 17 U/L (ref 0–70)
ANION GAP SERPL CALCULATED.3IONS-SCNC: 6 MMOL/L (ref 3–14)
AST SERPL W P-5'-P-CCNC: 14 U/L (ref 0–45)
BILIRUB SERPL-MCNC: 1 MG/DL (ref 0.2–1.3)
BUN SERPL-MCNC: 13 MG/DL (ref 7–30)
CALCIUM SERPL-MCNC: 8.7 MG/DL (ref 8.5–10.1)
CHLORIDE SERPL-SCNC: 104 MMOL/L (ref 94–109)
CHOLEST SERPL-MCNC: 112 MG/DL
CO2 SERPL-SCNC: 30 MMOL/L (ref 20–32)
CREAT SERPL-MCNC: 0.97 MG/DL (ref 0.66–1.25)
GFR SERPL CREATININE-BSD FRML MDRD: 87 ML/MIN/1.7M2
GLUCOSE SERPL-MCNC: 100 MG/DL (ref 70–99)
HDLC SERPL-MCNC: 35 MG/DL
LDLC SERPL CALC-MCNC: 52 MG/DL
NONHDLC SERPL-MCNC: 77 MG/DL
POTASSIUM SERPL-SCNC: 3.6 MMOL/L (ref 3.4–5.3)
PROT SERPL-MCNC: 6.9 G/DL (ref 6.8–8.8)
SODIUM SERPL-SCNC: 140 MMOL/L (ref 133–144)
TRIGL SERPL-MCNC: 125 MG/DL

## 2019-03-04 DIAGNOSIS — I10 ESSENTIAL HYPERTENSION: ICD-10-CM

## 2019-03-04 RX ORDER — LISINOPRIL AND HYDROCHLOROTHIAZIDE 12.5; 2 MG/1; MG/1
1 TABLET ORAL DAILY
Qty: 30 TABLET | Refills: 5 | OUTPATIENT
Start: 2019-03-04

## 2019-03-05 RX ORDER — LISINOPRIL AND HYDROCHLOROTHIAZIDE 12.5; 2 MG/1; MG/1
1 TABLET ORAL DAILY
Qty: 30 TABLET | Refills: 0 | Status: SHIPPED | OUTPATIENT
Start: 2019-03-05 | End: 2019-03-07

## 2019-03-07 ENCOUNTER — OFFICE VISIT (OUTPATIENT)
Dept: INTERNAL MEDICINE | Facility: CLINIC | Age: 39
End: 2019-03-07
Payer: COMMERCIAL

## 2019-03-07 VITALS
RESPIRATION RATE: 20 BRPM | WEIGHT: 153.9 LBS | SYSTOLIC BLOOD PRESSURE: 160 MMHG | HEART RATE: 82 BPM | HEIGHT: 66 IN | BODY MASS INDEX: 24.73 KG/M2 | DIASTOLIC BLOOD PRESSURE: 90 MMHG | OXYGEN SATURATION: 100 %

## 2019-03-07 DIAGNOSIS — I10 ESSENTIAL HYPERTENSION: ICD-10-CM

## 2019-03-07 PROCEDURE — 36415 COLL VENOUS BLD VENIPUNCTURE: CPT | Performed by: NURSE PRACTITIONER

## 2019-03-07 PROCEDURE — 80048 BASIC METABOLIC PNL TOTAL CA: CPT | Performed by: NURSE PRACTITIONER

## 2019-03-07 PROCEDURE — 99213 OFFICE O/P EST LOW 20 MIN: CPT | Performed by: NURSE PRACTITIONER

## 2019-03-07 RX ORDER — LISINOPRIL AND HYDROCHLOROTHIAZIDE 12.5; 2 MG/1; MG/1
2 TABLET ORAL DAILY
Qty: 60 TABLET | Refills: 1 | Status: SHIPPED | OUTPATIENT
Start: 2019-03-07 | End: 2019-04-08

## 2019-03-07 ASSESSMENT — MIFFLIN-ST. JEOR: SCORE: 1555.84

## 2019-03-07 ASSESSMENT — PAIN SCALES - GENERAL: PAINLEVEL: NO PAIN (0)

## 2019-03-07 NOTE — LETTER
March 8, 2019      Philipp Gupta  [address]  [facility] [address]        Dear ,    We are writing to inform you of your test results.    Normal    Resulted Orders   Basic metabolic panel   Result Value Ref Range    Sodium 142 133 - 144 mmol/L    Potassium 3.9 3.4 - 5.3 mmol/L    Chloride 106 94 - 109 mmol/L    Carbon Dioxide 31 20 - 32 mmol/L    Anion Gap 5 3 - 14 mmol/L    Glucose 94 70 - 99 mg/dL      Comment:      Fasting specimen    Urea Nitrogen 11 7 - 30 mg/dL    Creatinine 0.76 0.66 - 1.25 mg/dL    GFR Estimate >90 >60 mL/min/[1.73_m2]      Comment:      Non  GFR Calc  Starting 12/18/2018, serum creatinine based estimated GFR (eGFR) will be   calculated using the Chronic Kidney Disease Epidemiology Collaboration   (CKD-EPI) equation.      GFR Estimate If Black >90 >60 mL/min/[1.73_m2]      Comment:       GFR Calc  Starting 12/18/2018, serum creatinine based estimated GFR (eGFR) will be   calculated using the Chronic Kidney Disease Epidemiology Collaboration   (CKD-EPI) equation.      Calcium 8.6 8.5 - 10.1 mg/dL       If you have any questions or concerns, please call the clinic at the number listed above.       Sincerely,        KRYSTLE Patel CNP

## 2019-03-07 NOTE — PROGRESS NOTES
"  SUBJECTIVE:   Philipp Gupta is a 39 year old male who presents to clinic today for the following health issues:      Hypertension Follow-up      Outpatient blood pressures are being checked occasionally and have been elevated    Low Salt Diet: no added salt      Amount of exercise or physical activity: None    Problems taking medications regularly: No    Medication side effects: none    Diet: low salt    Checked in hospital and has not been down on medication   He has taken medication on and off for years   He does not tend to follow up  This was discussed          Problem list and histories reviewed & adjusted, as indicated.  Additional history:         Reviewed and updated as needed this visit by clinical staff  Tobacco  Meds  Fam Hx       Reviewed and updated as needed this visit by Provider         ROS:  Constitutional, HEENT, cardiovascular, pulmonary, gi and gu systems are negative, except as otherwise noted.    OBJECTIVE:     /90 (BP Location: Left arm, Patient Position: Sitting, Cuff Size: Adult Regular)   Pulse 82   Resp 20   Ht 1.676 m (5' 6\")   Wt 69.8 kg (153 lb 14.4 oz)   SpO2 100%   BMI 24.84 kg/m    Body mass index is 24.84 kg/m .  GENERAL:  alert and no distress  RESP: lungs clear to auscultation - no rales, rhonchi or wheezes  CV: regular rate and rhythm, normal S1 S2, no S3 or S4, no murmur, click or rub, no peripheral edema   ABDOMEN: soft, nontender,  and bowel sounds normal  MS: no gross musculoskeletal defects noted, no edema  NEURO: Normal strength and tone, mentation intact and speech normal  PSYCH: mentation appears normal, affect normal/bright    Diagnostic Test Results:  lab    ASSESSMENT/PLAN:             1. Essential hypertension  Needs improvement  - lisinopril-hydrochlorothiazide (PRINZIDE/ZESTORETIC) 20-12.5 MG tablet; Take 2 tablets by mouth daily  Dispense: 60 tablet; Refill: 1  - Basic metabolic panel    Patient Instructions   Lab in [address]    Lisinopril 20 " mg / 12.5 mg   Take 2 pills daily     Follow up in a month       KRYSTLE Patel CNP  [facility]

## 2019-03-08 LAB
ANION GAP SERPL CALCULATED.3IONS-SCNC: 5 MMOL/L (ref 3–14)
BUN SERPL-MCNC: 11 MG/DL (ref 7–30)
CALCIUM SERPL-MCNC: 8.6 MG/DL (ref 8.5–10.1)
CHLORIDE SERPL-SCNC: 106 MMOL/L (ref 94–109)
CO2 SERPL-SCNC: 31 MMOL/L (ref 20–32)
CREAT SERPL-MCNC: 0.76 MG/DL (ref 0.66–1.25)
GFR SERPL CREATININE-BSD FRML MDRD: >90 ML/MIN/{1.73_M2}
GLUCOSE SERPL-MCNC: 94 MG/DL (ref 70–99)
POTASSIUM SERPL-SCNC: 3.9 MMOL/L (ref 3.4–5.3)
SODIUM SERPL-SCNC: 142 MMOL/L (ref 133–144)

## 2019-04-08 ENCOUNTER — OFFICE VISIT (OUTPATIENT)
Dept: INTERNAL MEDICINE | Facility: CLINIC | Age: 39
End: 2019-04-08
Payer: COMMERCIAL

## 2019-04-08 VITALS
SYSTOLIC BLOOD PRESSURE: 157 MMHG | WEIGHT: 156.9 LBS | HEIGHT: 66 IN | RESPIRATION RATE: 18 BRPM | HEART RATE: 71 BPM | TEMPERATURE: 98.4 F | OXYGEN SATURATION: 97 % | DIASTOLIC BLOOD PRESSURE: 90 MMHG | BODY MASS INDEX: 25.22 KG/M2

## 2019-04-08 DIAGNOSIS — I10 ESSENTIAL HYPERTENSION: ICD-10-CM

## 2019-04-08 PROCEDURE — 99213 OFFICE O/P EST LOW 20 MIN: CPT | Performed by: FAMILY MEDICINE

## 2019-04-08 RX ORDER — LISINOPRIL AND HYDROCHLOROTHIAZIDE 12.5; 2 MG/1; MG/1
1 TABLET ORAL DAILY
Qty: 30 TABLET | Refills: 2 | Status: SHIPPED | OUTPATIENT
Start: 2019-04-08 | End: 2019-07-05

## 2019-04-08 RX ORDER — METOPROLOL SUCCINATE 50 MG/1
50 TABLET, EXTENDED RELEASE ORAL DAILY
Qty: 30 TABLET | Refills: 2 | Status: SHIPPED | OUTPATIENT
Start: 2019-04-08 | End: 2019-04-29

## 2019-04-08 ASSESSMENT — MIFFLIN-ST. JEOR: SCORE: 1569.44

## 2019-04-08 NOTE — PROGRESS NOTES
"  SUBJECTIVE:                                                    Philipp Gupta is a 39 year old male who presents to clinic today for the following health issues:  Patient here for blood pressure follow up.    Hypertension Follow-up      Outpatient blood pressures are not being checked.    Low Salt Diet: low salt      Amount of exercise or physical activity: 6-7 days/week for an average of greater than 60 minutes    Problems taking medications regularly: No    Medication side effects: Patient said medication does not work.    Diet: regular (no restrictions)    He has continued to have some elevation of blood pressure.  At his last visit he was told to take 2 of his lisinopril/hydrochlorothiazide 20/12.5.  He notes a little bit of backache.  It does not appear that this increased dose has helped much.      Patient Active Problem List   Diagnosis     Pain in joint, shoulder region     CARDIOVASCULAR SCREENING; LDL GOAL LESS THAN 160     Essential hypertension       REVIEW OF SYSTEMS    Weight stable.  No S OB.  No chest pain.  No headache or weakness.      History reviewed. No pertinent past medical history.      EXAM  /90 (BP Location: Left arm, Patient Position: Sitting, Cuff Size: Adult Regular)   Pulse 71   Temp 98.4  F (36.9  C) (Oral)   Resp 18   Ht 1.676 m (5' 6\")   Wt 71.2 kg (156 lb 14.4 oz)   SpO2 97%   BMI 25.32 kg/m      HEENT unremarkable.  Neck without thyromegaly.  Chest clear.  Cardiac RSR, 1/6 DAVID  Extremities without edema.      (I10) Essential hypertension  Comment:   I have suggested to go back to a single dose of lisinopril/hydrochlorothiazide daily and metoprolol 50 mg daily was added.  Close follow-up at 3 weeks.    Plan: lisinopril-hydrochlorothiazide         (PRINZIDE/ZESTORETIC) 20-12.5 MG tablet,         metoprolol succinate ER (TOPROL-XL) 50 MG 24 hr        tablet                        "

## 2019-04-08 NOTE — NURSING NOTE
"/90 (BP Location: Left arm, Patient Position: Sitting, Cuff Size: Adult Regular)   Pulse 71   Temp 98.4  F (36.9  C) (Oral)   Resp 18   Ht 1.676 m (5' 6\")   Wt 71.2 kg (156 lb 14.4 oz)   SpO2 97%   BMI 25.32 kg/m    Patient here for blood pressure follow up.  "

## 2019-04-18 ENCOUNTER — OFFICE VISIT (OUTPATIENT)
Dept: OPTOMETRY | Facility: CLINIC | Age: 39
End: 2019-04-18
Payer: COMMERCIAL

## 2019-04-18 DIAGNOSIS — H52.03 HYPEROPIA OF BOTH EYES WITH ASTIGMATISM: Primary | ICD-10-CM

## 2019-04-18 DIAGNOSIS — H52.4 PRESBYOPIA: ICD-10-CM

## 2019-04-18 DIAGNOSIS — H52.203 HYPEROPIA OF BOTH EYES WITH ASTIGMATISM: Primary | ICD-10-CM

## 2019-04-18 PROCEDURE — 92004 COMPRE OPH EXAM NEW PT 1/>: CPT | Performed by: OPTOMETRIST

## 2019-04-18 PROCEDURE — 92015 DETERMINE REFRACTIVE STATE: CPT | Performed by: OPTOMETRIST

## 2019-04-18 ASSESSMENT — REFRACTION_MANIFEST
OS_CYLINDER: +1.00
OD_AXIS: 155
OS_SPHERE: -0.25
OS_AXIS: 045
OS_ADD: +1.50
OS_AXIS: 43
OS_SPHERE: PLANO
OD_CYLINDER: +0.25
OD_AXIS: 177
OD_SPHERE: +0.25
OD_SPHERE: +0.75
OD_ADD: +1.50
OS_CYLINDER: +0.75
METHOD_AUTOREFRACTION: 1
OD_CYLINDER: +0.25

## 2019-04-18 ASSESSMENT — TONOMETRY
OS_IOP_MMHG: 16
OD_IOP_MMHG: 16
IOP_METHOD: APPLANATION

## 2019-04-18 ASSESSMENT — VISUAL ACUITY
OD_SC+: -2
METHOD: SNELLEN - LINEAR
OS_SC: 20/80
OS_PH_SC: 20/70
OD_SC: 20/80
OS_SC: 20/120
OD_SC: 20/20

## 2019-04-18 ASSESSMENT — CONF VISUAL FIELD
OD_NORMAL: 1
OS_NORMAL: 1
METHOD: COUNTING FINGERS

## 2019-04-18 ASSESSMENT — CUP TO DISC RATIO
OS_RATIO: 0.35
OD_RATIO: 0.35

## 2019-04-18 ASSESSMENT — EXTERNAL EXAM - LEFT EYE: OS_EXAM: NORMAL

## 2019-04-18 ASSESSMENT — SLIT LAMP EXAM - LIDS
COMMENTS: NORMAL
COMMENTS: NORMAL

## 2019-04-18 ASSESSMENT — EXTERNAL EXAM - RIGHT EYE: OD_EXAM: NORMAL

## 2019-04-18 NOTE — PROGRESS NOTES
Chief Complaint   Patient presents with     Annual Eye Exam         Patient reports he has had an unknown injury to his left eye- limited vision- not sure what happened      Last Eye Exam:  6-10+ years  Dilated Previously: Yes    What are you currently using to see?  does not use glasses or contacts       Distance Vision Acuity: Satisfied with vision    Near Vision Acuity: Noticed more trouble reading in dim light    Eye Comfort: good  Do you use eye drops? : No  Occupation or Hobbies:     Dahiana Coyne, Optometric Assistant          Medical, surgical and family histories reviewed and updated 4/18/2019.       OBJECTIVE: See Ophthalmology exam    ASSESSMENT:    ICD-10-CM    1. Hyperopia of both eyes with astigmatism H52.03     H52.203    2. Presbyopia H52.4 EYE EXAM (SIMPLE-NONBILLABLE)     REFRACTION      PLAN:     Will go with single vision reading or over the counter +1.75    Brenda Byrd OD

## 2019-04-18 NOTE — LETTER
4/18/2019         RE: Philipp Gupta  [address]  [facility] [facility] [address]        Dear Colleague,    [facility] Please see a copy of my visit note below.    Chief Complaint   Patient presents with     Annual Eye Exam         Patient reports he has had an unknown injury to his left eye- limited vision- not sure what happened      Last Eye Exam:  6-10+ years  Dilated Previously: Yes    What are you currently using to see?  does not use glasses or contacts       Distance Vision Acuity: Satisfied with vision    Near Vision Acuity: Noticed more trouble reading in dim light    Eye Comfort: good  Do you use eye drops? : No  Occupation or Hobbies:     Dahiana Coyne, Optometric Assistant          Medical, surgical and family histories reviewed and updated 4/18/2019.       OBJECTIVE: See Ophthalmology exam    ASSESSMENT:    ICD-10-CM    1. Hyperopia of both eyes with astigmatism H52.03     H52.203    2. Presbyopia H52.4 EYE EXAM (SIMPLE-NONBILLABLE)     REFRACTION      PLAN:     Will go with single vision reading or over the counter +1.75    Brenda Byrd OD     Again, thank you for allowing me to participate in the care of your patient.        Sincerely,        Brenda Byrd, OD

## 2019-04-18 NOTE — PATIENT INSTRUCTIONS
You could get reading glasses or a bifocal    Patient Education     What Is Presbyopia?  Presbyopia is the loss of close-up focusing. It is not a disease. It is the normal aging process of the eye. When you are younger, the lens in your eye changes shape to focus light directly on the back of your eye (the retina). But as you get older, the lens hardens and can't change its shape as easily. It then can t focus clearly on close objects. This makes them look blurry.      What are the symptoms?  Presbyopia makes it hard to do things close up. This includes reading small print, using tools, or threading a needle. The first sign may be a tired feeling when you look at something close up. Presbyopia most often starts when you re 40 to 45 years old, but can begin when you're in your late 30s.   Date Last Reviewed: 10/1/2017    9908-6832 [employer]. [address] [place] [address], [place] [address]. All rights reserved. This information is not intended as a substitute for professional medical care. Always follow your healthcare professional's instructions.

## 2019-04-29 ENCOUNTER — OFFICE VISIT (OUTPATIENT)
Dept: INTERNAL MEDICINE | Facility: CLINIC | Age: 39
End: 2019-04-29
Payer: COMMERCIAL

## 2019-04-29 VITALS
HEART RATE: 67 BPM | HEIGHT: 66 IN | RESPIRATION RATE: 16 BRPM | SYSTOLIC BLOOD PRESSURE: 145 MMHG | BODY MASS INDEX: 24.94 KG/M2 | DIASTOLIC BLOOD PRESSURE: 94 MMHG | WEIGHT: 155.2 LBS | OXYGEN SATURATION: 100 % | TEMPERATURE: 97.7 F

## 2019-04-29 DIAGNOSIS — I10 ESSENTIAL HYPERTENSION: ICD-10-CM

## 2019-04-29 PROCEDURE — 99213 OFFICE O/P EST LOW 20 MIN: CPT | Performed by: FAMILY MEDICINE

## 2019-04-29 RX ORDER — METOPROLOL SUCCINATE 100 MG/1
100 TABLET, EXTENDED RELEASE ORAL DAILY
Qty: 30 TABLET | Refills: 2 | Status: SHIPPED | OUTPATIENT
Start: 2019-04-29 | End: 2019-07-09

## 2019-04-29 ASSESSMENT — MIFFLIN-ST. JEOR: SCORE: 1561.73

## 2019-04-29 NOTE — PROGRESS NOTES
"  SUBJECTIVE:   Philipp Gupta is a 39 year old male who presents to clinic today for the following   health issues:      Hypertension Follow-up      Outpatient blood pressures are not being checked.    Low Salt Diet: low salt      Amount of exercise or physical activity: Walk    Problems taking medications regularly: No    Medication side effects: none    Diet: regular (no restrictions)    I had added beta blocker at last visit.  Tolerating well.      Patient Active Problem List   Diagnosis     Pain in joint, shoulder region     CARDIOVASCULAR SCREENING; LDL GOAL LESS THAN 160     Essential hypertension       REVIEW OF SYSTEMS    No SOB  No CP  No HA or weakness      Past Medical History:   Diagnosis Date     Hypertension        EXAM  BP (!) 145/94 (BP Location: Left arm, Patient Position: Sitting, Cuff Size: Adult Regular)   Pulse 67   Temp 97.7  F (36.5  C) (Oral)   Resp 16   Ht 1.676 m (5' 6\")   Wt 70.4 kg (155 lb 3.2 oz)   SpO2 100%   BMI 25.05 kg/m        (I10) Essential hypertension  Comment:     Will increase Metoprolol.  See instructions..    Plan: metoprolol succinate ER (TOPROL-XL) 100 MG 24         hr tablet                    "

## 2019-06-17 ENCOUNTER — ALLIED HEALTH/NURSE VISIT (OUTPATIENT)
Dept: NURSING | Facility: CLINIC | Age: 39
End: 2019-06-17
Payer: COMMERCIAL

## 2019-06-17 DIAGNOSIS — Z01.30 BP CHECK: Primary | ICD-10-CM

## 2019-06-17 PROCEDURE — 99207 ZZC NO CHARGE NURSE ONLY: CPT

## 2019-06-17 NOTE — NURSING NOTE
Pt here today for nurse-only BP check. Spoke to Dr. Gonzalez and confirmed that today's appointment was actually supposed to be with Dr. Gonzalez himself to follow up on some medication changes, and not with a nurse.   Spoke to pt and advised him he will need to schedule an appointment with Dr. Gonzalez. He seemed to get agitated and said he could just go have this appointment at a pharmacy by his house-advised him he needs to meet with a doctor and not a pharmacist or a nurse in order for him to be able to continue getting refills, that it is not just a matter of having his blood pressure checked somewhere.  Pt said he understood, but then still walked out the door saying he was just going to go to his pharmacy.   Routed to Dr. Gonzalez as an FYI.

## 2019-07-05 DIAGNOSIS — I10 ESSENTIAL HYPERTENSION: ICD-10-CM

## 2019-07-05 RX ORDER — LISINOPRIL AND HYDROCHLOROTHIAZIDE 12.5; 2 MG/1; MG/1
1 TABLET ORAL DAILY
Qty: 30 TABLET | Refills: 0 | Status: SHIPPED | OUTPATIENT
Start: 2019-07-05 | End: 2019-07-09

## 2019-07-05 RX ORDER — METOPROLOL SUCCINATE 100 MG/1
100 TABLET, EXTENDED RELEASE ORAL DAILY
Qty: 30 TABLET | Refills: 2 | Status: CANCELLED | OUTPATIENT
Start: 2019-07-05

## 2019-07-05 NOTE — TELEPHONE ENCOUNTER
Pending Prescriptions:                       Disp   Refills    lisinopril-hydrochlorothiazide (PRINZIDE/*30 tab*2            Sig: Take 1 tablet by mouth daily    Last filled 6-5-19  For a 30 day supply     Thank you,  Stacy Briggs [facility]  [phone]

## 2019-07-05 NOTE — TELEPHONE ENCOUNTER
"Requested Prescriptions   Pending Prescriptions Disp Refills     lisinopril-hydrochlorothiazide (PRINZIDE/ZESTORETIC) 20-12.5 MG tablet 30 tablet 2     Sig: Take 1 tablet by mouth daily       Diuretics (Including Combos) Protocol Failed - 7/5/2019 10:52 AM        Failed - Blood pressure under 140/90 in past 12 months     BP Readings from Last 3 Encounters:   04/29/19 (!) 145/94   04/08/19 157/90   03/07/19 160/90                 Failed - Recent (12 mo) or future (30 days) visit within the authorizing provider's specialty     Patient had office visit in the last 12 months or has a visit in the next 30 days with authorizing provider or within the authorizing provider's specialty.  See \"Patient Info\" tab in inbasket, or \"Choose Columns\" in Meds & Orders section of the refill encounter.              Passed - Medication is active on med list        Passed - Patient is age 18 or older        Passed - Normal serum creatinine on file in past 12 months     Recent Labs   Lab Test 03/07/19  0853   CR 0.76              Passed - Normal serum potassium on file in past 12 months     Recent Labs   Lab Test 03/07/19  0853   POTASSIUM 3.9                    Passed - Normal serum sodium on file in past 12 months     Recent Labs   Lab Test 03/07/19  0853                 Medication is being filled for 1 time refill only due to:  Patient needs to be seen because due for f/u - scheduled.    "

## 2019-07-05 NOTE — TELEPHONE ENCOUNTER
Pending Prescriptions:                       Disp   Refills    metoprolol succinate ER (TOPROL-XL) 100 M*30 tab*2            Sig: Take 1 tablet (100 mg) by mouth daily    Last filled 7-5-19 but is now out of refills and planning ahead for next month    Thank you,  Stacy Briggs [facility]  [phone]

## 2019-07-09 ENCOUNTER — OFFICE VISIT (OUTPATIENT)
Dept: INTERNAL MEDICINE | Facility: CLINIC | Age: 39
End: 2019-07-09
Payer: COMMERCIAL

## 2019-07-09 VITALS
HEIGHT: 66 IN | RESPIRATION RATE: 16 BRPM | HEART RATE: 69 BPM | DIASTOLIC BLOOD PRESSURE: 86 MMHG | WEIGHT: 152.3 LBS | OXYGEN SATURATION: 98 % | BODY MASS INDEX: 24.48 KG/M2 | TEMPERATURE: 98.5 F | SYSTOLIC BLOOD PRESSURE: 150 MMHG

## 2019-07-09 DIAGNOSIS — I10 ESSENTIAL HYPERTENSION: ICD-10-CM

## 2019-07-09 PROCEDURE — 99213 OFFICE O/P EST LOW 20 MIN: CPT | Performed by: FAMILY MEDICINE

## 2019-07-09 RX ORDER — LISINOPRIL AND HYDROCHLOROTHIAZIDE 12.5; 2 MG/1; MG/1
1 TABLET ORAL DAILY
Qty: 30 TABLET | Refills: 3 | Status: SHIPPED | OUTPATIENT
Start: 2019-07-09 | End: 2019-10-22

## 2019-07-09 RX ORDER — METOPROLOL SUCCINATE 100 MG/1
100 TABLET, EXTENDED RELEASE ORAL DAILY
Qty: 30 TABLET | Refills: 2 | Status: SHIPPED | OUTPATIENT
Start: 2019-07-09 | End: 2019-10-22

## 2019-07-09 RX ORDER — LISINOPRIL AND HYDROCHLOROTHIAZIDE 12.5; 2 MG/1; MG/1
1 TABLET ORAL DAILY
Qty: 30 TABLET | Refills: 2 | Status: SHIPPED | OUTPATIENT
Start: 2019-07-09 | End: 2019-07-09

## 2019-07-09 ASSESSMENT — MIFFLIN-ST. JEOR: SCORE: 1548.58

## 2019-07-09 NOTE — PROGRESS NOTES
"  CHIEF COMPLAINT    Blood pressure follow-up.      HISTORY    Patient is feeling well in general.  Most recent change of medication has been addition of beta-blocker.  He states that previously when his lisinopril-HCTZ was increased, he got a headache.    He is tolerating the present medications well.      Patient Active Problem List   Diagnosis     Pain in joint, shoulder region     CARDIOVASCULAR SCREENING; LDL GOAL LESS THAN 160     Essential hypertension       REVIEW OF SYSTEMS    Weight stable.  No S OB.  No palpitations or chest pains.  No edema.      Past Medical History:   Diagnosis Date     Hypertension          EXAM  /86 (BP Location: Right arm, Patient Position: Sitting, Cuff Size: Adult Regular)   Pulse 69   Temp 98.5  F (36.9  C) (Oral)   Resp 16   Ht 1.676 m (5' 6\")   Wt 69.1 kg (152 lb 4.8 oz)   SpO2 98%   BMI 24.58 kg/m       Thin man.  Neck no thyromegaly.  Chest clear.  Cardiac regular, 2/6 systolic murmur heard best at apex.  Extremities no edema.      (I10) Essential hypertension  Comment:   Plan: metoprolol succinate ER (TOPROL-XL) 100 MG 24         hr tablet, lisinopril-hydrochlorothiazide         (PRINZIDE/ZESTORETIC) 20-12.5 MG tablet,         DISCONTINUED: lisinopril-hydrochlorothiazide         (PRINZIDE/ZESTORETIC) 20-12.5 MG tablet              "

## 2019-10-21 ENCOUNTER — OFFICE VISIT (OUTPATIENT)
Dept: FAMILY MEDICINE | Facility: CLINIC | Age: 39
End: 2019-10-21
Payer: COMMERCIAL

## 2019-10-21 VITALS — DIASTOLIC BLOOD PRESSURE: 90 MMHG | TEMPERATURE: 98.5 F | SYSTOLIC BLOOD PRESSURE: 156 MMHG

## 2019-10-21 DIAGNOSIS — Z71.84 TRAVEL ADVICE ENCOUNTER: Primary | ICD-10-CM

## 2019-10-21 PROCEDURE — 90471 IMMUNIZATION ADMIN: CPT | Mod: GA | Performed by: NURSE PRACTITIONER

## 2019-10-21 PROCEDURE — 90734 MENACWYD/MENACWYCRM VACC IM: CPT | Mod: GA | Performed by: NURSE PRACTITIONER

## 2019-10-21 PROCEDURE — 90472 IMMUNIZATION ADMIN EACH ADD: CPT | Mod: GA | Performed by: NURSE PRACTITIONER

## 2019-10-21 PROCEDURE — 90715 TDAP VACCINE 7 YRS/> IM: CPT | Mod: GA | Performed by: NURSE PRACTITIONER

## 2019-10-21 PROCEDURE — 90691 TYPHOID VACCINE IM: CPT | Mod: GA | Performed by: NURSE PRACTITIONER

## 2019-10-21 PROCEDURE — 99402 PREV MED CNSL INDIV APPRX 30: CPT | Mod: 25 | Performed by: NURSE PRACTITIONER

## 2019-10-21 RX ORDER — ATOVAQUONE AND PROGUANIL HYDROCHLORIDE 250; 100 MG/1; MG/1
1 TABLET, FILM COATED ORAL DAILY
Qty: 30 TABLET | Refills: 0 | Status: SHIPPED | OUTPATIENT
Start: 2019-10-21 | End: 2020-05-28

## 2019-10-21 NOTE — PROGRESS NOTES
Nurse Note      Itinerary:  [facility]      Departure Date: 10/26/19      Return Date: 12/6/19      Length of Trip 1 month      Reason for Travel: Visiting friends and relatives           Urban or rural: both      Accommodations: Family home        IMMUNIZATION HISTORY  Have you received any immunizations within the past 4 weeks?  No  Have you ever fainted from having your blood drawn or from an injection?  No  Have you ever had a fever reaction to vaccination?  No  Have you ever had any bad reaction or side effect from any vaccination?  No  Have you ever had hepatitis A or B vaccine?  Yes  Do you live (or work closely) with anyone who has AIDS, an AIDS-like condition, any other immune disorder or who is on chemotherapy for cancer?  No  Do you have a family history of immunodeficiency?  No  Have you received any injection of immune globulin or any blood products during the past 12 months?  No    Patient roomed by Ivan Rosas  Philipp Gupta is a 39 year old male seen today alone for counsultation for international travel to the stated countries.   Patient will be departing in  5 day(s) and  traveling alone.      Patient itinerary :  will be in the urban  region of [facility] which presents risk for Malaria and Yellow Fever. exposure.      Patient's activities will include visiting friends and relatives.    Patient's country [place] birth is [facility], [place] years     Special medical concerns: BP has been followed (last visit 07/2019)  Pre-travel questionnaire was completed by patient and reviewed by provider.     Vitals: BP (!) 156/90 (BP Location: Right arm, Cuff Size: Adult Regular)   Temp 98.5  F (36.9  C) (Oral)   BMI= There is no height or weight on file to calculate BMI.    EXAM:  General:  Well-nourished, well-developed in no acute distress.  Appears to be stated age, interacts appropriately and expresses understanding of information given to patient.    Current  Outpatient Medications   Medication Sig Dispense Refill     atovaquone-proguanil (MALARONE) 250-100 MG tablet Take 1 tablet by mouth daily Start 1 days before exposure to Malaria ([facility] area ) and continue daily till  7 days after exposure. 30 tablet 0     lisinopril-hydrochlorothiazide (PRINZIDE/ZESTORETIC) 20-12.5 MG tablet Take 1 tablet by mouth daily 30 tablet 3     metoprolol succinate ER (TOPROL-XL) 100 MG 24 hr tablet Take 1 tablet (100 mg) by mouth daily 30 tablet 2     Patient Active Problem List   Diagnosis     Pain in joint, shoulder region     CARDIOVASCULAR SCREENING; LDL GOAL LESS THAN 160     Essential hypertension     No Known Allergies      Immunizations discussed include:   Hepatitis A:  Up to date  Hepatitis B: Up to date  Influenza: Up to date  Typhoid: Ordered/given today, risks, benefits and side effects reviewed  Rabies: Declined  reviewed managment of a animal bite or scratch (washing wound, seek medical care within 24 hours for post exposure prophylaxis )  Yellow Fever: Up to date  Japanese Encephalitis: Not indicated  Meningococcus: Ordered/given today, risks, benefits and side effects reviewed  Tetanus/Diphtheria: Ordered/given today, risks, benefits and side effects reviewed  Measles/Mumps/Rubella: Up to date  Cholera: Not needed  Polio: Up to date  Pneumococcal: Up to date  Varicella: up tp date  Zostavax:  Not indicated  Shingrix: Not indicated  HPV:  Not indicated  TB:  Low risk     Altitude Exposure on this trip: no   Past tolerance to Altitude: na    ASSESSMENT/PLAN:    ICD-10-CM    1. Travel advice encounter Z71.84 atovaquone-proguanil (MALARONE) 250-100 MG tablet     I have reviewed general recommendations for safe travel   including: food/water precautions, insect precautions, safer sex   practices given high prevalence of Zika, HIV and other STDs,   roadway safety. Educational materials and [employer] report provided.    Malaraia prophylaxis recommended: Malarone  Symptomatic  treatment for traveler's diarrhea: declines medication due to cost, advised Imodium   Altitude illness prevention and treatment: none    Advised continued follow-up of bp       Evacuation insurance advised and resources were provided to patient.    Total visit time 30 minutes  with over 50% of time spent counseling patient as detailed above.    Garima Maria CNP

## 2019-10-21 NOTE — PATIENT INSTRUCTIONS
Today October 21, 2019 you received the    Tetanus (Tdap) Vaccine    Meningococcal (Menactra) Vaccine    Typhoid - injectable. This vaccine is valid for two years.   .    These appointments can be made as a NURSE ONLY visit.    **It is very important for the vaccinations to be given on the scheduled day(s), this helps ensure you receive the full effectiveness of the vaccine.**    Please call [facility] with any questions [phone]    Thank you for visiting [facility]

## 2019-10-21 NOTE — NURSING NOTE
"Chief Complaint   Patient presents with     [facility]     initial BP (!) 156/90 (BP Location: Right arm, Cuff Size: Adult Regular)   Temp 98.5  F (36.9  C) (Oral)  Estimated body mass index is 24.58 kg/m  as calculated from the following:    Height as of 7/9/19: 1.676 m (5' 6\").    Weight as of 7/9/19: 69.1 kg (152 lb 4.8 oz).  BP completed using cuff size: regular.   R arm      Health Maintenance that is potentially due pending provider review:  NONE    n/a    Ivan Andrade ma  "

## 2019-10-22 ENCOUNTER — OFFICE VISIT (OUTPATIENT)
Dept: INTERNAL MEDICINE | Facility: CLINIC | Age: 39
End: 2019-10-22
Payer: COMMERCIAL

## 2019-10-22 VITALS
DIASTOLIC BLOOD PRESSURE: 88 MMHG | RESPIRATION RATE: 18 BRPM | TEMPERATURE: 99 F | OXYGEN SATURATION: 97 % | WEIGHT: 151.6 LBS | HEART RATE: 68 BPM | HEIGHT: 66 IN | SYSTOLIC BLOOD PRESSURE: 170 MMHG | BODY MASS INDEX: 24.36 KG/M2

## 2019-10-22 DIAGNOSIS — I10 HYPERTENSION, UNSPECIFIED TYPE: ICD-10-CM

## 2019-10-22 DIAGNOSIS — D69.6 THROMBOCYTOPENIA (H): ICD-10-CM

## 2019-10-22 DIAGNOSIS — R73.9 BLOOD SUGAR INCREASED: ICD-10-CM

## 2019-10-22 DIAGNOSIS — Z00.00 ROUTINE GENERAL MEDICAL EXAMINATION AT A HEALTH CARE FACILITY: Primary | ICD-10-CM

## 2019-10-22 LAB
ALBUMIN SERPL-MCNC: 4 G/DL (ref 3.4–5)
ALP SERPL-CCNC: 56 U/L (ref 40–150)
ALT SERPL W P-5'-P-CCNC: 20 U/L (ref 0–70)
ANION GAP SERPL CALCULATED.3IONS-SCNC: 6 MMOL/L (ref 3–14)
AST SERPL W P-5'-P-CCNC: 9 U/L (ref 0–45)
BILIRUB SERPL-MCNC: 1.2 MG/DL (ref 0.2–1.3)
BUN SERPL-MCNC: 11 MG/DL (ref 7–30)
CALCIUM SERPL-MCNC: 8.4 MG/DL (ref 8.5–10.1)
CHLORIDE SERPL-SCNC: 106 MMOL/L (ref 94–109)
CHOLEST SERPL-MCNC: 125 MG/DL
CO2 SERPL-SCNC: 27 MMOL/L (ref 20–32)
CREAT SERPL-MCNC: 0.85 MG/DL (ref 0.66–1.25)
ERYTHROCYTE [DISTWIDTH] IN BLOOD BY AUTOMATED COUNT: 13.2 % (ref 10–15)
GFR SERPL CREATININE-BSD FRML MDRD: >90 ML/MIN/{1.73_M2}
GLUCOSE SERPL-MCNC: 89 MG/DL (ref 70–99)
HBA1C MFR BLD: 4.7 % (ref 0–5.6)
HCT VFR BLD AUTO: 42.9 % (ref 40–53)
HDLC SERPL-MCNC: 39 MG/DL
HGB BLD-MCNC: 15.3 G/DL (ref 13.3–17.7)
LDLC SERPL CALC-MCNC: 66 MG/DL
MCH RBC QN AUTO: 30.5 PG (ref 26.5–33)
MCHC RBC AUTO-ENTMCNC: 35.7 G/DL (ref 31.5–36.5)
MCV RBC AUTO: 86 FL (ref 78–100)
NONHDLC SERPL-MCNC: 86 MG/DL
PLATELET # BLD AUTO: 108 10E9/L (ref 150–450)
POTASSIUM SERPL-SCNC: 3.4 MMOL/L (ref 3.4–5.3)
PROT SERPL-MCNC: 7 G/DL (ref 6.8–8.8)
RBC # BLD AUTO: 5.01 10E12/L (ref 4.4–5.9)
SODIUM SERPL-SCNC: 139 MMOL/L (ref 133–144)
TRIGL SERPL-MCNC: 99 MG/DL
TSH SERPL DL<=0.005 MIU/L-ACNC: 2.6 MU/L (ref 0.4–4)
WBC # BLD AUTO: 8.2 10E9/L (ref 4–11)

## 2019-10-22 PROCEDURE — 36415 COLL VENOUS BLD VENIPUNCTURE: CPT | Performed by: INTERNAL MEDICINE

## 2019-10-22 PROCEDURE — 99214 OFFICE O/P EST MOD 30 MIN: CPT | Mod: 25 | Performed by: INTERNAL MEDICINE

## 2019-10-22 PROCEDURE — 80053 COMPREHEN METABOLIC PANEL: CPT | Performed by: INTERNAL MEDICINE

## 2019-10-22 PROCEDURE — 84443 ASSAY THYROID STIM HORMONE: CPT | Performed by: INTERNAL MEDICINE

## 2019-10-22 PROCEDURE — 82043 UR ALBUMIN QUANTITATIVE: CPT | Performed by: INTERNAL MEDICINE

## 2019-10-22 PROCEDURE — 99395 PREV VISIT EST AGE 18-39: CPT | Performed by: INTERNAL MEDICINE

## 2019-10-22 PROCEDURE — 80061 LIPID PANEL: CPT | Performed by: INTERNAL MEDICINE

## 2019-10-22 PROCEDURE — 82088 ASSAY OF ALDOSTERONE: CPT | Performed by: INTERNAL MEDICINE

## 2019-10-22 PROCEDURE — 85027 COMPLETE CBC AUTOMATED: CPT | Performed by: INTERNAL MEDICINE

## 2019-10-22 PROCEDURE — 84244 ASSAY OF RENIN: CPT | Mod: 90 | Performed by: INTERNAL MEDICINE

## 2019-10-22 PROCEDURE — 40001081 ZZHCL STATISICAL ALDOSTERONE/RENIN RATIO: Performed by: INTERNAL MEDICINE

## 2019-10-22 PROCEDURE — 99000 SPECIMEN HANDLING OFFICE-LAB: CPT | Performed by: INTERNAL MEDICINE

## 2019-10-22 PROCEDURE — 83036 HEMOGLOBIN GLYCOSYLATED A1C: CPT | Performed by: INTERNAL MEDICINE

## 2019-10-22 RX ORDER — LISINOPRIL AND HYDROCHLOROTHIAZIDE 12.5; 2 MG/1; MG/1
1 TABLET ORAL 2 TIMES DAILY
Qty: 180 TABLET | Refills: 1 | Status: SHIPPED | OUTPATIENT
Start: 2019-10-22 | End: 2020-05-28

## 2019-10-22 RX ORDER — METOPROLOL SUCCINATE 100 MG/1
100 TABLET, EXTENDED RELEASE ORAL DAILY
Qty: 90 TABLET | Refills: 1 | Status: SHIPPED | OUTPATIENT
Start: 2019-10-22 | End: 2020-05-27

## 2019-10-22 ASSESSMENT — ENCOUNTER SYMPTOMS
DIZZINESS: 0
SHORTNESS OF BREATH: 0
HEADACHES: 0
COUGH: 0
PARESTHESIAS: 0
EYE PAIN: 0
JOINT SWELLING: 0
HEMATOCHEZIA: 0
ARTHRALGIAS: 0
CHILLS: 0
SORE THROAT: 0
DYSURIA: 0
NAUSEA: 0
DIARRHEA: 0
FEVER: 0
CONSTIPATION: 0
PALPITATIONS: 0
HEMATURIA: 0
WEAKNESS: 0
FREQUENCY: 0
MYALGIAS: 0
HEARTBURN: 0
ABDOMINAL PAIN: 0
NERVOUS/ANXIOUS: 0

## 2019-10-22 ASSESSMENT — MIFFLIN-ST. JEOR: SCORE: 1545.4

## 2019-10-22 NOTE — PROGRESS NOTES
Dr Dinh's note    Patient's instructions / PLAN:                                                        Plan:  1. Increase the Lisinopril/hctz to 1 tablet twice a day  2. Continue Metoprolol same dose  3. Labs today   4. When you come back from [place] schedule these tests:  -- kidney ultrasound - To schedule this test you may call Scheduling center at [phone]    -- blood pressure monitor for 24 hours - To schedule this test please call [facility] at: [phone]   5. Follow up in January          ASSESSMENT & PLAN:                                                      (Z00.00) Routine general medical examination at a health care facility  (primary encounter diagnosis)  Comment:   Plan: Aldosterone, Renin activity, Aldosterone Renin         Ratio, Comprehensive metabolic panel, CBC with         platelets, Lipid panel reflex to direct LDL         Fasting, Albumin Random Urine Quantitative with        Creat Ratio, TSH with free T4 reflex            (I10) Hypertension, unspecified type  Comment: Not controlled   To rule out secondary causes  Plan: Aldosterone, Renin activity, Aldosterone Renin         Ratio, US Renal Complete w Doppler Complete, 24        Hour Blood Pressure Monitor - Adult, Lipid         panel reflex to direct LDL Fasting, Albumin         Random Urine Quantitative with Creat Ratio, TSH        with free T4 reflex,         lisinopril-hydrochlorothiazide         (PRINZIDE/ZESTORETIC) 20-12.5 MG tablet,         metoprolol succinate ER (TOPROL-XL) 100 MG 24         hr tablet            (R73.09) Blood sugar increased  Comment:   Plan: Hemoglobin A1c            (D69.6) Thrombocytopenia (H)  Comment:   Plan: CBC       Chief Complaint:                                                      Annual exam  Follow up chronic medical problems  - HTN    SUBJECTIVE:                                                    History of present illness     We reviewed the chronic medical problems as above.   I reviewed  "the recent tests results in Epic     HTN  -- he takes the meds daily \" but the BP stays the same\"  --He is frustrated because he takes the medication, he has the impression that the medications are rolled were not working for him  --He is young with high blood pressure.  I explained to him that I would like to do more blood test for him.  --This weekend he plans to go to \[facility]\"" for a month    Thrombocytopenia   -- 116 - 123 since 2011  --He is not aware of it    ROS:    See below        PMHx: - reviewed  Past Medical History:   Diagnosis Date     Hypertension          PSHx: reviewed  History reviewed. No pertinent surgical history.     Soc Hx: No daily alcohol, no smoking  Social History     Socioeconomic History     Marital status: Single     Spouse name: Not on file     Number of children: Not on file     Years of education: Not on file     Highest education level: Not on file   Occupational History     Not on file   Social Needs     Financial resource strain: Not on file     Food insecurity:     Worry: Not on file     Inability: Not on file     Transportation needs:     Medical: Not on file     Non-medical: Not on file   Tobacco Use     Smoking status: Never Smoker     Smokeless tobacco: Never Used   Substance and Sexual Activity     Alcohol use: No     Drug use: No     Sexual activity: Yes     Partners: Female   Lifestyle     Physical activity:     Days per week: Not on file     Minutes per session: Not on file     Stress: Not on file   Relationships     Social connections:     Talks on phone: Not on file     Gets together: Not on file     Attends Shinto service: Not on file     Active member of club or organization: Not on file     Attends meetings of clubs or organizations: Not on file     Relationship status: Not on file     Intimate partner violence:     Fear of current or ex partner: Not on file     Emotionally abused: Not on file     Physically abused: Not on file     Forced sexual activity: Not on " "file   Other Topics Concern     Parent/sibling w/ CABG, MI or angioplasty before 65F 55M? Not Asked   Social History Narrative     Not on file        Fam Hx: reviewed  Family History   Problem Relation Age of Onset     Glaucoma No family hx of      Macular Degeneration No family hx of          Screening: reviewed    All: reviewed    Meds: reviewed  Current Outpatient Medications   Medication Sig Dispense Refill     atovaquone-proguanil (MALARONE) 250-100 MG tablet Take 1 tablet by mouth daily Start 1 days before exposure to Malaria ([facility] area ) and continue daily till  7 days after exposure. 30 tablet 0     lisinopril-hydrochlorothiazide (PRINZIDE/ZESTORETIC) 20-12.5 MG tablet Take 1 tablet by mouth daily 30 tablet 3     metoprolol succinate ER (TOPROL-XL) 100 MG 24 hr tablet Take 1 tablet (100 mg) by mouth daily 30 tablet 2           OBJECTIVE:                                                    Physical Exam :      Blood pressure (!) 170/88, pulse 68, temperature 99  F (37.2  C), temperature source Oral, resp. rate 18, height 1.676 m (5' 6\"), weight 68.8 kg (151 lb 9.6 oz), SpO2 97 %.   NAD, appears comfortable  Skin clear, no rashes  HEENT: PERRLA, EOMI, anicteric sclera, pink conjunctiva, external ears appear normal, bilateral tympanic membranes clinically normal, oropharynx normal color.  Neck: supple, no JVD,  no thyroidmegaly  Lymph nodes non palpable in the cervical, supraclavicular axillaries, inguinal areas  Chest: clear to auscultation with good respiratory effort  Cardiac: S1S2, RRR, no mgr appreciated  Abdomen: soft, not tender, not distended, audible bowel sound, no hepatosplenomegaly, no palpable masses, no abdominal bruits  Extremities: no cyanosis, clubbing or edema.   Neuro: A, Ox3, no focal signs.  Breast exam no gynecomastia, no masses        Mickie Dinh MD  Internal Medicine     SUBJECTIVE:   CC: Philipp Gupta is an 39 year old male who presents for preventative health visit.     Healthy " Habits:     Getting at least 3 servings of Calcium per day:  Yes    Bi-annual eye exam:  Yes    Dental care twice a year:  NO    Sleep apnea or symptoms of sleep apnea:  None    Diet:  Regular (no restrictions) and Vegetarian/vegan    Frequency of exercise:  4-5 days/week    Duration of exercise:  45-60 minutes    Taking medications regularly:  Yes    Medication side effects:  None    PHQ-2 Total Score: 0    Additional concerns today:  No    Ability to successfully perform activities of daily living: Yes, no assistance needed  Home safety:  none identified   Hearing impairment: None            Today's PHQ-2 Score:   PHQ-2 ( 1999 [employer]) 10/22/2019   Q1: Little interest or pleasure in doing things 0   Q2: Feeling down, depressed or hopeless 0   PHQ-2 Score 0   Q1: Little interest or pleasure in doing things Not at all   Q2: Feeling down, depressed or hopeless Not at all   PHQ-2 Score 0       Abuse: Current or Past(Physical, Sexual or Emotional)- No  Do you feel safe in your environment? Yes    Social History     Tobacco Use     Smoking status: Never Smoker     Smokeless tobacco: Never Used   Substance Use Topics     Alcohol use: No     If you drink alcohol do you typically have >3 drinks per day or >7 drinks per week? No    Alcohol Use 10/22/2019   Prescreen: >3 drinks/day or >7 drinks/week? No   Prescreen: >3 drinks/day or >7 drinks/week? -       Last PSA: No results found for: PSA    Reviewed orders with patient. Reviewed health maintenance and updated orders accordingly - Yes  Labs reviewed in EPIC    Reviewed and updated as needed this visit by clinical staff  Allergies  Meds         Reviewed and updated as needed this visit by Provider            Review of Systems   Constitutional: Negative for chills and fever.   HENT: Negative for congestion, ear pain, hearing loss and sore throat.    Eyes: Negative for pain and visual disturbance.   Respiratory: Negative for cough and shortness of breath.    Cardiovascular:  "Negative for chest pain, palpitations and peripheral edema.   Gastrointestinal: Negative for abdominal pain, constipation, diarrhea, heartburn, hematochezia and nausea.   Genitourinary: Negative for discharge, dysuria, frequency, genital sores, hematuria, impotence and urgency.   Musculoskeletal: Negative for arthralgias, joint swelling and myalgias.   Skin: Negative for rash.   Neurological: Negative for dizziness, weakness, headaches and paresthesias.   Psychiatric/Behavioral: Negative for mood changes. The patient is not nervous/anxious.        COUNSELING:   Reviewed preventive health counseling, as reflected in patient instructions       Regular exercise       Healthy diet/nutrition    Estimated body mass index is 24.58 kg/m  as calculated from the following:    Height as of 7/9/19: 1.676 m (5' 6\").    Weight as of 7/9/19: 69.1 kg (152 lb 4.8 oz).          reports that he has never smoked. He has never used smokeless tobacco.      Counseling Resources:  ATP IV Guidelines  Pooled Cohorts Equation Calculator  FRAX Risk Assessment  ICSI Preventive Guidelines  Dietary Guidelines for Americans, 2010  USDA's MyPlate  ASA Prophylaxis  Lung CA Screening    Mikcie Pierre MD  [facility]  "

## 2019-10-22 NOTE — PATIENT INSTRUCTIONS
Plan:  1. Increase the Lisinopril/hctz to 1 tablet twice a day  2. Continue Metoprolol same dose  3. Labs today   4. When you come back from [place] schedule these tests:  -- kidney ultrasound - To schedule this test you may call Scheduling center at [phone]    -- blood pressure monitor for 24 hours - To schedule this test please call [facility] at: [phone]   5. Follow up in January

## 2019-10-22 NOTE — LETTER
[facility]  [address], [address]  [place] [address]  [phone]        October 26, 2019    Philipp Gupta  [address]  [facility] [address]            Dear Mr. Philipp Gupta:    These are the recent blood test results.  Your platelets are on the low side, but in the same range compared with the prior years.  The rest of the results are in acceptable range.  I see no changes in the blood test results to explain your elevated blood pressure.  We will discuss more about these tests on your follow-up appointment for the blood pressure when you come back to [place].    Sincerely,    Mickie Dinh MD  Internal Medicine     These are some general explanations for tests  WBC means White Blood Cells  Platelets are small blood cells that help with forming the blood clots along with other blood factors.  Electrolytes are Sodium, Potassium, Calcium, Magnesium, Phosphorus.  Liver tests are: AST, ALT, Bilirubin, Alkaline Phosphatase.  Kidney tests are Creatinine, GFR.  HDL Cholesterol - is the good cholesterol and it is good to have it high.  LDL cholesterol is the bad cholesterol and it is good to have it low.  It is recommended to have LDL less than 130 for people with hypertension and to have it less than 100 for people with heart disease, diabetes and chronic kidney disease.  Thyroid tests are TSH, T4, T3  A1c is a test that gives us an idea about how well was controlled the diabetes for the last 3 months.   PSA stands for Prostate Specific Antigen and it can be elevated with prostate cancer or prostate inflammation.

## 2019-10-23 LAB
CREAT UR-MCNC: 166 MG/DL
MICROALBUMIN UR-MCNC: 18 MG/L
MICROALBUMIN/CREAT UR: 10.9 MG/G CR (ref 0–17)
RENIN PLAS-CCNC: <0.1 NG/ML/HR

## 2019-10-25 LAB
ALDOST SERPL-MCNC: 10.9 NG/DL (ref 0–31)
ALDOSTERONE RENIN RATIO: NORMAL (ref 0–25)

## 2020-05-26 DIAGNOSIS — I10 HYPERTENSION, UNSPECIFIED TYPE: ICD-10-CM

## 2020-05-27 RX ORDER — METOPROLOL SUCCINATE 100 MG/1
TABLET, EXTENDED RELEASE ORAL
Qty: 30 TABLET | Refills: 0 | Status: SHIPPED | OUTPATIENT
Start: 2020-05-27 | End: 2020-05-28

## 2020-05-28 ENCOUNTER — VIRTUAL VISIT (OUTPATIENT)
Dept: INTERNAL MEDICINE | Facility: CLINIC | Age: 40
End: 2020-05-28
Payer: COMMERCIAL

## 2020-05-28 DIAGNOSIS — I10 HYPERTENSION, UNSPECIFIED TYPE: ICD-10-CM

## 2020-05-28 PROCEDURE — 99214 OFFICE O/P EST MOD 30 MIN: CPT | Mod: TEL | Performed by: INTERNAL MEDICINE

## 2020-05-28 RX ORDER — METOPROLOL SUCCINATE 100 MG/1
100 TABLET, EXTENDED RELEASE ORAL DAILY
Qty: 90 TABLET | Refills: 0 | Status: SHIPPED | OUTPATIENT
Start: 2020-05-28 | End: 2021-08-31

## 2020-05-28 RX ORDER — LISINOPRIL AND HYDROCHLOROTHIAZIDE 12.5; 2 MG/1; MG/1
1 TABLET ORAL 2 TIMES DAILY
Qty: 90 TABLET | Refills: 0 | Status: SHIPPED | OUTPATIENT
Start: 2020-05-28 | End: 2021-08-31

## 2020-05-28 NOTE — PROGRESS NOTES
"Philipp Gupta is a 40 year old male who is being evaluated via a billable telephone visit.      The patient has been notified of following:     \"This telephone visit will be conducted via a call between you and your physician/provider. We have found that certain health care needs can be provided without the need for a physical exam.  This service lets us provide the care you need with a short phone conversation.  If a prescription is necessary we can send it directly to your pharmacy.  If lab work is needed we can place an order for that and you can then stop by our lab to have the test done at a later time.    Telephone visits are billed at different rates depending on your insurance coverage. During this emergency period, for some insurers they may be billed the same as an in-person visit.  Please reach out to your insurance provider with any questions.    If during the course of the call the physician/provider feels a telephone visit is not appropriate, you will not be charged for this service.\"    Patient has given verbal consent for Video visit?  Yes    What phone number would you like to be contacted at? [phone]    How would you like to obtain your AVS? Mail a copy         This is a VIDEO encounter with the patient.       09:02 --- 09:19     Location of the provider : office  Location of the patient : home         Dr Dinh's note      Patient's instructions / PLAN:                                                        Plan:  1. Continue same meds, same doses for now   2. To schedule this test please call [facility] at: [phone]   3. Monitor the BP        ASSESSMENT & PLAN:                                                      (I10) Hypertension, unspecified type  Comment: Not controlled   He has his own opinions about the BP treatment. He agrees for second opinion. Cardio referral - done   Plan: lisinopril-hydrochlorothiazide (ZESTORETIC)         20-12.5 MG tablet, metoprolol succinate ER         " (TOPROL-XL) 100 MG 24 hr tablet, CARDIO          ADULT REFERRAL               Chief complaint:                                                      HTN    SUBJECTIVE:                                                    History of present illness:    HTN  -- he takes Toprol 100 mg once a day. Lisinopril 20/12.5 he takes only one tab a day because he gets a headache.   -- he hasn't check the blood pressure recently  --  I ordered 24 h BP monitor and Kidney US - not done  -- labs Oct 2019 - reviewed - in normal range   -- he states he wants to know why the meds are not working for him. I explained him he needs more tests and he needs more meds. He doesn't want to do them. I offered him referral to cardio to talk about BP. He agrees.       Hypertension Follow-up      Do you check your blood pressure regularly outside of the clinic? No     Are you following a low salt diet? Yes    Are your blood pressures ever more than 140 on the top number (systolic) OR more   than 90 on the bottom number (diastolic), for example 140/90? No      How many servings of fruits and vegetables do you eat daily?  2-3    On average, how many sweetened beverages do you drink each day (Examples: soda, juice, sweet tea, etc.  Do NOT count diet or artificially sweetened beverages)?   1    How many days per week do you exercise enough to make your heart beat faster? 7    How many minutes a day do you exercise enough to make your heart beat faster? 20 - 29    How many days per week do you miss taking your medication? 0      Review of Systems:                                                      ROS: negative for fever, chills, cough, wheezes, chest pain, shortness of breath, vomiting, abdominal pain, leg swelling     A 10-point review of systems was obtained.  Those pertinent are above and in the in the Subjective section.  The rest of the systems are negative.           OBJECTIVE:           An actual physical exam can't be done during phone  visit   A limited exam can sometimes be performed by video visit       PMHx: reviewed  Past Medical History:   Diagnosis Date     Hypertension       PSHx: reviewed  History reviewed. No pertinent surgical history.     Meds: reviewed  Current Outpatient Medications   Medication Sig Dispense Refill     lisinopril-hydrochlorothiazide (PRINZIDE/ZESTORETIC) 20-12.5 MG tablet Take 1 tablet by mouth 2 times daily 180 tablet 1     metoprolol succinate ER (TOPROL-XL) 100 MG 24 hr tablet TAKE ONE TABLET (100 MG) BY MOUTH ONCE DAILY 30 tablet 0       Soc Hx: reviewed  Fam Hx: reviewed          Mickie Dinh MD  Internal Medicine

## 2021-08-31 ENCOUNTER — OFFICE VISIT (OUTPATIENT)
Dept: INTERNAL MEDICINE | Facility: CLINIC | Age: 41
End: 2021-08-31
Payer: COMMERCIAL

## 2021-08-31 VITALS
HEART RATE: 82 BPM | WEIGHT: 148.1 LBS | DIASTOLIC BLOOD PRESSURE: 98 MMHG | TEMPERATURE: 97.9 F | HEIGHT: 67 IN | OXYGEN SATURATION: 99 % | BODY MASS INDEX: 23.25 KG/M2 | SYSTOLIC BLOOD PRESSURE: 168 MMHG

## 2021-08-31 DIAGNOSIS — D69.6 THROMBOCYTOPENIA (H): ICD-10-CM

## 2021-08-31 DIAGNOSIS — Z00.00 ROUTINE GENERAL MEDICAL EXAMINATION AT A HEALTH CARE FACILITY: Primary | ICD-10-CM

## 2021-08-31 DIAGNOSIS — I10 ESSENTIAL HYPERTENSION: ICD-10-CM

## 2021-08-31 DIAGNOSIS — Z23 HIGH PRIORITY FOR 2019-NCOV VACCINE: ICD-10-CM

## 2021-08-31 DIAGNOSIS — Z13.220 ENCOUNTER FOR SCREENING FOR LIPID DISORDER: ICD-10-CM

## 2021-08-31 DIAGNOSIS — R01.1 HEART MURMUR: ICD-10-CM

## 2021-08-31 LAB
ALBUMIN SERPL-MCNC: 4 G/DL (ref 3.4–5)
ALP SERPL-CCNC: 60 U/L (ref 40–150)
ALT SERPL W P-5'-P-CCNC: 24 U/L (ref 0–70)
ANION GAP SERPL CALCULATED.3IONS-SCNC: <1 MMOL/L (ref 3–14)
AST SERPL W P-5'-P-CCNC: 12 U/L (ref 0–45)
BILIRUB SERPL-MCNC: 0.9 MG/DL (ref 0.2–1.3)
BUN SERPL-MCNC: 8 MG/DL (ref 7–30)
CALCIUM SERPL-MCNC: 8.8 MG/DL (ref 8.5–10.1)
CHLORIDE BLD-SCNC: 107 MMOL/L (ref 94–109)
CHOLEST SERPL-MCNC: 149 MG/DL
CO2 SERPL-SCNC: 35 MMOL/L (ref 20–32)
CREAT SERPL-MCNC: 0.94 MG/DL (ref 0.66–1.25)
ERYTHROCYTE [DISTWIDTH] IN BLOOD BY AUTOMATED COUNT: 12.8 % (ref 10–15)
FASTING STATUS PATIENT QL REPORTED: YES
GFR SERPL CREATININE-BSD FRML MDRD: >90 ML/MIN/1.73M2
GLUCOSE BLD-MCNC: 119 MG/DL (ref 70–99)
HCT VFR BLD AUTO: 44.9 % (ref 40–53)
HDLC SERPL-MCNC: 54 MG/DL
HGB BLD-MCNC: 16.1 G/DL (ref 13.3–17.7)
LDLC SERPL CALC-MCNC: 75 MG/DL
MCH RBC QN AUTO: 31.4 PG (ref 26.5–33)
MCHC RBC AUTO-ENTMCNC: 35.9 G/DL (ref 31.5–36.5)
MCV RBC AUTO: 88 FL (ref 78–100)
NONHDLC SERPL-MCNC: 95 MG/DL
PLATELET # BLD AUTO: 108 10E3/UL (ref 150–450)
POTASSIUM BLD-SCNC: 4.1 MMOL/L (ref 3.4–5.3)
PROT SERPL-MCNC: 7 G/DL (ref 6.8–8.8)
RBC # BLD AUTO: 5.12 10E6/UL (ref 4.4–5.9)
SODIUM SERPL-SCNC: 139 MMOL/L (ref 133–144)
TRIGL SERPL-MCNC: 99 MG/DL
WBC # BLD AUTO: 4.6 10E3/UL (ref 4–11)

## 2021-08-31 PROCEDURE — 0001A COVID-19,PF,PFIZER: CPT | Performed by: INTERNAL MEDICINE

## 2021-08-31 PROCEDURE — 36415 COLL VENOUS BLD VENIPUNCTURE: CPT | Performed by: INTERNAL MEDICINE

## 2021-08-31 PROCEDURE — 80053 COMPREHEN METABOLIC PANEL: CPT | Performed by: INTERNAL MEDICINE

## 2021-08-31 PROCEDURE — 80061 LIPID PANEL: CPT | Performed by: INTERNAL MEDICINE

## 2021-08-31 PROCEDURE — 99396 PREV VISIT EST AGE 40-64: CPT | Mod: 25 | Performed by: INTERNAL MEDICINE

## 2021-08-31 PROCEDURE — 85027 COMPLETE CBC AUTOMATED: CPT | Performed by: INTERNAL MEDICINE

## 2021-08-31 PROCEDURE — 91300 PR COVID VAC PFIZER DIL RECON 30 MCG/0.3 ML IM: CPT | Performed by: INTERNAL MEDICINE

## 2021-08-31 PROCEDURE — 99214 OFFICE O/P EST MOD 30 MIN: CPT | Mod: 25 | Performed by: INTERNAL MEDICINE

## 2021-08-31 RX ORDER — AMLODIPINE BESYLATE 10 MG/1
10 TABLET ORAL DAILY
Qty: 90 TABLET | Refills: 1 | Status: SHIPPED | OUTPATIENT
Start: 2021-08-31

## 2021-08-31 ASSESSMENT — ENCOUNTER SYMPTOMS
SORE THROAT: 0
SHORTNESS OF BREATH: 0
HEARTBURN: 0
NERVOUS/ANXIOUS: 0
DIARRHEA: 0
WEAKNESS: 0
PARESTHESIAS: 0
PALPITATIONS: 0
DIZZINESS: 0
COUGH: 0
EYE PAIN: 0
MYALGIAS: 0
NAUSEA: 0
DYSURIA: 0
ABDOMINAL PAIN: 0
HEADACHES: 0
HEMATURIA: 0
CHILLS: 0
ARTHRALGIAS: 0
HEMATOCHEZIA: 0
CONSTIPATION: 0
FEVER: 0
JOINT SWELLING: 0
FREQUENCY: 0

## 2021-08-31 ASSESSMENT — MIFFLIN-ST. JEOR: SCORE: 1539.37

## 2021-08-31 NOTE — LETTER
[facility]  [address] [address]  Phone: [phone]   8/31/2021      Philipp Gupta  [address] [address]        Dear Mr. Philipp Gupta:    I am writing to inform you of the results of the laboratory tests you had done recently. Your electrolytes and kidneys look good. Your liver looks good. Your glucose is mildly elevated. Your cholesterol panel is normal. Your blood counts are largely normal except your platelets remain decreased which is not a new finding for you. I'm not sure why your platelets are consistently low and this may be worth looking into with additional blood tests if you're interested.    Thank you for allowing me to participate in your care. If you have any further questions or problems, please contact me via our nurse line at [phone]. An even easier way to get ahold of myself or my team is through [employer], which you can sign up for at [email or web address]. [employer] is not only a great way to communicate with us, but also allows you to see your full results.      Sincerely,        Gael Castro MD, MPH  Department of Internal Medicine  [facility]

## 2021-08-31 NOTE — PATIENT INSTRUCTIONS
- Our team will contact you via [employer] (if you sign up for it), telephone call (if results are urgent), or otherwise via letter in the mail with the results of today's lab tests once I have a chance to review them    - If you have itching in your anal area, try to find hydrocortisone anal cream over the counter    Today we discussed your hypertension (high blood pressure). Four important things to remember about your hypertension:    1) Take all medications as prescribed! Today we discussed your blood pressure medications and decided to start a new medication called amlodipine. One benefit of this medication is that it does not require yearly blood draws. About 5-10% of patients notice they get leg swelling with this medication. This of course means that 90-95% of patients do not. The leg swelling is not dangerous to your health but can be bothersome. If you are part of the unlucky 5-10%, please let me know and we will switch you to a different blood pressure medication.    2) Lose weight! Losing weight is the best thing you can do to lower your blood pressure. Studies have found that for every 2 pounds you lose, your blood pressure will go down by 1 point. Ex: if you lose 10 pounds, your blood pressure should go down by 5 points. That's better than any medication, plus it will impact your health in a number of other positive ways. This may be a good time to make a weight loss goal.    3) Lower your sodium intake! Eating too much salt causes your body to hold onto extra water, which makes your blood pressure higher. Ditching the salt shaker is a good thing, but most of our sodium intake actually comes from pre-packaged foods. Read labels on cans and food packages. The labels tell you how much sodium is in each serving. Make sure that you look at the serving size. If you eat more than the serving size, you have eaten more sodium. Decreasing your sodium intake by more than 1,000mg per day can lower your blood  pressure by up to 5 points and can be as effective as starting a blood pressure medication.    4) Check your blood pressure at home! You can buy a home monitor in a drugstore, supermarket pharmacy, or other large store. Not all automated blood pressure machines are created equal. You can find a list of validated blood pressure cuffs (meaning they have been confirmed to give accurate readings) by going to www.validatebp.org. That website does not sell blood pressure cuffs, but rather it lists the exact models that have been validated to be accurate. Wrist blood pressure cuffs do not provide reliable comparisons to upper arm (brachial) cuffs. Be sure the arm cuff is the right size for your arm. Ask someone to measure around your upper arm. If your upper arm is more than 13 inches around, buy a monitor with a large cuff. To get a correct measurement, the cuff needs to be the right size.    It is important to measure your blood pressure periodically at home in between office visits. The readings you obtain during these blood pressure checks are often more valuable than the readings we obtain in clinic. However, it is even more important to check your blood pressure CORRECTLY at home. Follow these tips.      Check your blood pressure in the early morning (before you eat, drink, or take any medicines) and at one other random time of day. The random time of day does not need to be consistent from day to day.    Put the cuff on your arm. Remove clothes that get in the way of the cuff. Don t roll up your sleeve in a way that s tight around your arm. The cord should go toward your hand. Line it up with the middle of your forearm. The Velcro should attach easily on the cuff. If it doesn t reach, you may need a bigger cuff.    Wait 30 minutes if you have just eaten a lot, had a drink with caffeine or alcohol, used tobacco products, or exercised. Use the restroom if you need to. (Needing to go can raise your BP.)    Rest both  feet flat on the floor with your back supported. Rest your arm at heart level on a table or the arm of a chair.    Sit quietly for 5 minutes or more before taking your blood pressure. Avoid talking while your blood pressure is being measured.    Start the monitor. Press the button or squeeze the ball to measure your blood pressure. Write down the time, the measurement, and your pulse.    Wait 2 minutes.    Repeat 2 more times.    Take the average of the readings. That's your blood pressure.    Lastly, bring your home monitor into the office for us to validate! Compare your blood pressure monitor to the standardized method we use. It's a good idea to do this once per machine or if your machine starts giving you odd readings (suddenly much higher or lower than you're used to).

## 2021-08-31 NOTE — PROGRESS NOTES
SUBJECTIVE:   CC: Philipp Gupta is an 41 year old male who presents for preventative health visit.     Patient has been advised of split billing requirements and indicates understanding: Yes     Healthy Habits:     Getting at least 3 servings of Calcium per day:  Yes    Bi-annual eye exam:  Yes    Dental care twice a year:  NO    Sleep apnea or symptoms of sleep apnea:  None    Diet:  Vegetarian/vegan    Frequency of exercise:  2-3 days/week    Duration of exercise:  30-45 minutes    Taking medications regularly:  Yes    Medication side effects:  None    PHQ-2 Total Score: 0    Additional concerns today:  No    Philipp presents today for a physical exam. We also discussed his high blood pressure. He is not taking any blood pressure medications right now. He tells me he's not against taking blood pressure medications but that none of them have worked in the past so he's not sure why he'd keep taking them.    Today's PHQ-2 Score:   PHQ-2 ( 1999 Pf[employer]) 8/31/2021   Q1: Little interest or pleasure in doing things 0   Q2: Feeling down, depressed or hopeless 0   PHQ-2 Score 0   Q1: Little interest or pleasure in doing things Not at all   Q2: Feeling down, depressed or hopeless Not at all   PHQ-2 Score 0     Abuse: Current or Past(Physical, Sexual or Emotional)- No  Do you feel safe in your environment? Yes    Social History     Tobacco Use     Smoking status: Never Smoker     Smokeless tobacco: Never Used   Substance Use Topics     Alcohol use: No     If you drink alcohol do you typically have >3 drinks per day or >7 drinks per week? No    Alcohol Use 8/31/2021   Prescreen: >3 drinks/day or >7 drinks/week? No   Prescreen: >3 drinks/day or >7 drinks/week? -     Last PSA: No results found for: PSA    Reviewed orders with patient. Reviewed health maintenance and updated orders accordingly - Yes    Labs reviewed in EPIC    Reviewed and updated as needed this visit by clinical staff  Tobacco  Allergies  Meds  Problems   "Med Hx  Surg Hx  Fam Hx        Reviewed and updated as needed this visit by Provider  Tobacco  Allergies  Meds  Problems  Med Hx  Surg Hx  Fam Hx           Review of Systems   Constitutional: Negative for chills and fever.   HENT: Negative for congestion, ear pain, hearing loss and sore throat.    Eyes: Negative for pain and visual disturbance.   Respiratory: Negative for cough and shortness of breath.    Cardiovascular: Negative for chest pain, palpitations and peripheral edema.   Gastrointestinal: Negative for abdominal pain, constipation, diarrhea, heartburn, hematochezia and nausea.   Genitourinary: Negative for discharge, dysuria, frequency, genital sores, hematuria, impotence and urgency.   Musculoskeletal: Negative for arthralgias, joint swelling and myalgias.   Skin: Negative for rash.   Neurological: Negative for dizziness, weakness, headaches and paresthesias.   Psychiatric/Behavioral: Negative for mood changes. The patient is not nervous/anxious.      OBJECTIVE:   BP (!) 168/98   Pulse 82   Temp 97.9  F (36.6  C) (Tympanic)   Ht 1.708 m (5' 7.25\")   Wt 67.2 kg (148 lb 1.6 oz)   SpO2 99%   BMI 23.02 kg/m      Physical Exam  GENERAL: Alert and in no distress.  EYES: Conjunctivae/corneas clear. EOMs grossly intact  HENT: NC/AT, facies symmetric.  RESP: CTAB. No w/r/r.  CV: RRR, no rubs or gallops. 2/6 murmur heard best over RUSB.  GI: NT, ND, without rebound or guarding, no CVA tenderness  MSK: Moves all four extremities freely.  SKIN: No significant ulcers, lesions or rashes on the visualized portions of the skin  NEURO: Alert. Oriented.  PSYCH: Linear thought process. Speech normal rate and volume. No tangential thoughts, hallucinations, or delusions.    Diagnostic Test Results: Labs reviewed in [facility]    ASSESSMENT/PLAN:   (Z00.00) Routine general medical examination at a health care facility  (primary encounter diagnosis)  Reviewed PMH. Discussed healthcare maintenance issues, including " cancer screenings (not due), relevant immunizations, and cardiac risk factor screenings such as for cholesterol, HTN, and DM.    (I10) Essential hypertension  BP uncontrolled. Past work-up for secondary causes did reveal PAC >10 ng/dL AND PRA <0.1 ng/dL/hr. This is suggestive of primary hyperaldosteronism but would require confirmatory testing. Philipp was not interested in pursuing that at this time but is willing to re-start an anti-HTN medication. Has previously been on thiazide, ACEi, and beta blocker. I do not see any history of him being on a CCB so will trial amlodipine given his understandable concern that past meds have not worked to control his BP. If this doesn't help, would consider trialing triamterene. I discussed why it's important to control his BP with him today. Encouraged him to enroll in our [facility] pharmacy's BP control program since his meds are filled through that pharmacy. TTE ordered as well given LVH seen on past TTE.  - amLODIPine (NORVASC) 10 MG tablet, Comprehensive metabolic panel, Echocardiogram Complete    (R01.1) Heart murmur  Present on exam. 2009 TTE reviewed and no obvious etiology of murmur seen but did show some concentric LVH which I would imagine may have gotten worse given his uncontrolled BP since then. Will repeat TTE to work up this murmur and LVH.  - Echocardiogram Complete    (D69.6) Thrombocytopenia (H)  Present on past CBCs. Unclear etiology. Denies any s/sx of bleeding.  - CBC with platelets    (Z13.220) Encounter for screening for lipid disorder  - Lipid Profile    (Z23) High priority for 2019-nCoV vaccine  First dose given today.  - COVID-19,PF,PFIZER    Patient has been advised of split billing requirements and indicates understanding: Yes     COUNSELING:   Special attention given to:        Regular exercise       Healthy diet/nutrition       Immunizations    Vaccinated for: COVID-19    Estimated body mass index is 23.02 kg/m  as calculated from the  "following:    Height as of this encounter: 1.708 m (5' 7.25\").    Weight as of this encounter: 67.2 kg (148 lb 1.6 oz).    He reports that he has never smoked. He has never used smokeless tobacco.    Gael Castro MD  [facility]  "

## 2021-09-02 ENCOUNTER — HOSPITAL ENCOUNTER (OUTPATIENT)
Dept: CARDIOLOGY | Facility: CLINIC | Age: 41
Discharge: HOME | End: 2021-09-02
Attending: INTERNAL MEDICINE | Admitting: INTERNAL MEDICINE
Payer: COMMERCIAL

## 2021-09-02 DIAGNOSIS — R01.1 HEART MURMUR: ICD-10-CM

## 2021-09-02 DIAGNOSIS — I10 ESSENTIAL HYPERTENSION: ICD-10-CM

## 2021-09-02 LAB — LVEF ECHO: NORMAL

## 2021-09-02 PROCEDURE — 93306 TTE W/DOPPLER COMPLETE: CPT

## 2021-09-02 PROCEDURE — 93306 TTE W/DOPPLER COMPLETE: CPT | Mod: 26 | Performed by: INTERNAL MEDICINE

## 2021-09-21 ENCOUNTER — ALLIED HEALTH/NURSE VISIT (OUTPATIENT)
Dept: INTERNAL MEDICINE | Facility: CLINIC | Age: 41
End: 2021-09-21

## 2021-09-21 ENCOUNTER — IMMUNIZATION (OUTPATIENT)
Dept: NURSING | Facility: CLINIC | Age: 41
End: 2021-09-21
Payer: COMMERCIAL

## 2021-09-21 VITALS — SYSTOLIC BLOOD PRESSURE: 120 MMHG | DIASTOLIC BLOOD PRESSURE: 70 MMHG

## 2021-09-21 DIAGNOSIS — I10 ESSENTIAL HYPERTENSION: Primary | ICD-10-CM

## 2021-09-21 PROCEDURE — 91300 PR COVID VAC PFIZER DIL RECON 30 MCG/0.3 ML IM: CPT

## 2021-09-21 PROCEDURE — 99207 PR NO CHARGE NURSE ONLY: CPT | Performed by: INTERNAL MEDICINE

## 2021-09-21 PROCEDURE — 0002A PR COVID VAC PFIZER DIL RECON 30 MCG/0.3 ML IM: CPT

## 2021-09-21 NOTE — PROGRESS NOTES
Philipp Gupta was evaluated at [facility] on September 21, 2021 at which time his blood pressure was:    BP Readings from Last 3 Encounters:   09/21/21 120/70   08/31/21 (!) 168/98   10/22/19 (!) 170/88     Pulse Readings from Last 3 Encounters:   08/31/21 82   10/22/19 68   07/09/19 69       Reviewed lifestyle modifications for blood pressure control and reduction: including making healthy food choices, managing weight, getting regular exercise, smoking cessation, reducing alcohol consumption, monitoring blood pressure regularly.     Symptoms: None    BP Goal:< 140/90 mmHg    BP Assessment:  BP at goal    Potential Reasons for BP too high: NA - Not applicable    BP Follow-Up Plan: Recheck BP in 6 months at pharmacy    Recommendation to Provider: Continue current therapy    Note completed by: Irwin Hensley, PharmD  [facility]  [phone]

## 2022-05-03 NOTE — NURSING NOTE
"BP (!) 150/96 (BP Location: Right arm, Patient Position: Sitting, Cuff Size: Adult Regular)   Pulse 68   Temp 99  F (37.2  C) (Oral)   Resp 18   Ht 1.676 m (5' 6\")   Wt 68.8 kg (151 lb 9.6 oz)   SpO2 97%   BMI 24.47 kg/m      "
AROM

## 2023-06-08 ENCOUNTER — TRANSFERRED RECORDS (OUTPATIENT)
Dept: HEALTH INFORMATION MANAGEMENT | Facility: CLINIC | Age: 43
End: 2023-06-08

## 2024-10-11 ENCOUNTER — OFFICE VISIT (OUTPATIENT)
Dept: INTERNAL MEDICINE | Facility: CLINIC | Age: 44
End: 2024-10-11
Payer: COMMERCIAL

## 2024-10-11 VITALS
DIASTOLIC BLOOD PRESSURE: 110 MMHG | WEIGHT: 162.2 LBS | RESPIRATION RATE: 18 BRPM | BODY MASS INDEX: 25.22 KG/M2 | OXYGEN SATURATION: 97 % | TEMPERATURE: 97.8 F | SYSTOLIC BLOOD PRESSURE: 174 MMHG | HEART RATE: 104 BPM

## 2024-10-11 DIAGNOSIS — I10 ESSENTIAL HYPERTENSION: Primary | ICD-10-CM

## 2024-10-11 DIAGNOSIS — M54.50 LUMBAR PAIN: ICD-10-CM

## 2024-10-11 DIAGNOSIS — K64.4 EXTERNAL HEMORRHOIDS: ICD-10-CM

## 2024-10-11 LAB
ALBUMIN SERPL BCG-MCNC: 4.8 G/DL (ref 3.5–5.2)
ALP SERPL-CCNC: 64 U/L (ref 40–150)
ALT SERPL W P-5'-P-CCNC: 12 U/L (ref 0–70)
ANION GAP SERPL CALCULATED.3IONS-SCNC: 9 MMOL/L (ref 7–15)
AST SERPL W P-5'-P-CCNC: 20 U/L (ref 0–45)
BASOPHILS # BLD AUTO: 0 10E3/UL (ref 0–0.2)
BASOPHILS NFR BLD AUTO: 1 %
BILIRUB SERPL-MCNC: 1 MG/DL
BUN SERPL-MCNC: 10.7 MG/DL (ref 6–20)
CALCIUM SERPL-MCNC: 9.3 MG/DL (ref 8.8–10.4)
CHLORIDE SERPL-SCNC: 103 MMOL/L (ref 98–107)
CREAT SERPL-MCNC: 0.95 MG/DL (ref 0.67–1.17)
EGFRCR SERPLBLD CKD-EPI 2021: >90 ML/MIN/1.73M2
EOSINOPHIL # BLD AUTO: 0.1 10E3/UL (ref 0–0.7)
EOSINOPHIL NFR BLD AUTO: 2 %
ERYTHROCYTE [DISTWIDTH] IN BLOOD BY AUTOMATED COUNT: 12.5 % (ref 10–15)
GLUCOSE SERPL-MCNC: 136 MG/DL (ref 70–99)
HCO3 SERPL-SCNC: 28 MMOL/L (ref 22–29)
HCT VFR BLD AUTO: 45.9 % (ref 40–53)
HGB BLD-MCNC: 16.6 G/DL (ref 13.3–17.7)
IMM GRANULOCYTES # BLD: 0 10E3/UL
IMM GRANULOCYTES NFR BLD: 0 %
LYMPHOCYTES # BLD AUTO: 1.2 10E3/UL (ref 0.8–5.3)
LYMPHOCYTES NFR BLD AUTO: 21 %
MCH RBC QN AUTO: 30.6 PG (ref 26.5–33)
MCHC RBC AUTO-ENTMCNC: 36.2 G/DL (ref 31.5–36.5)
MCV RBC AUTO: 85 FL (ref 78–100)
MONOCYTES # BLD AUTO: 0.4 10E3/UL (ref 0–1.3)
MONOCYTES NFR BLD AUTO: 7 %
NEUTROPHILS # BLD AUTO: 3.9 10E3/UL (ref 1.6–8.3)
NEUTROPHILS NFR BLD AUTO: 70 %
PLATELET # BLD AUTO: 121 10E3/UL (ref 150–450)
POTASSIUM SERPL-SCNC: 3.5 MMOL/L (ref 3.4–5.3)
PROT SERPL-MCNC: 7.6 G/DL (ref 6.4–8.3)
RBC # BLD AUTO: 5.43 10E6/UL (ref 4.4–5.9)
SODIUM SERPL-SCNC: 140 MMOL/L (ref 135–145)
WBC # BLD AUTO: 5.6 10E3/UL (ref 4–11)

## 2024-10-11 PROCEDURE — 99204 OFFICE O/P NEW MOD 45 MIN: CPT | Performed by: INTERNAL MEDICINE

## 2024-10-11 PROCEDURE — 80053 COMPREHEN METABOLIC PANEL: CPT | Performed by: INTERNAL MEDICINE

## 2024-10-11 PROCEDURE — 36415 COLL VENOUS BLD VENIPUNCTURE: CPT | Performed by: INTERNAL MEDICINE

## 2024-10-11 PROCEDURE — 85025 COMPLETE CBC W/AUTO DIFF WBC: CPT | Performed by: INTERNAL MEDICINE

## 2024-10-11 RX ORDER — NAPROXEN 500 MG/1
500 TABLET ORAL 2 TIMES DAILY WITH MEALS
Qty: 60 TABLET | Refills: 0 | Status: SHIPPED | OUTPATIENT
Start: 2024-10-11

## 2024-10-11 RX ORDER — HYDROCORTISONE 25 MG/G
CREAM TOPICAL 2 TIMES DAILY PRN
Qty: 30 G | Refills: 1 | Status: SHIPPED | OUTPATIENT
Start: 2024-10-11

## 2024-10-11 RX ORDER — LOSARTAN POTASSIUM 50 MG/1
50 TABLET ORAL DAILY
Qty: 90 TABLET | Refills: 0 | Status: SHIPPED | OUTPATIENT
Start: 2024-10-11

## 2024-10-11 ASSESSMENT — PAIN SCALES - GENERAL: PAINLEVEL: MILD PAIN (2)

## 2024-10-11 ASSESSMENT — ENCOUNTER SYMPTOMS: BACK PAIN: 1

## 2024-10-11 NOTE — PROGRESS NOTES
Assessment & Plan     Essential hypertension  At this time, patient very well out of the control.  Patient was agreeable to proceed with a trial of losartan 50 mg with continued to see if this will help control his blood pressure.  Side effects of ARB were reviewed.  He was encouraged to follow-up in approximately 3 to 4 weeks for repeat blood pressure assessment.  - losartan (COZAAR) 50 MG tablet; Take 1 tablet (50 mg) by mouth daily.  - Comprehensive metabolic panel (BMP + Alb, Alk Phos, ALT, AST, Total. Bili, TP); Future  - CBC with platelets and differential; Future    Lumbar pain  Patient is noted to have tenderness to palpation of the paraspinous muscles of the region.  I did discuss the use of anti-inflammatories for management of his discomfort.  Patient encouraged to try to avoid any heavy lifting or exertion if possible.  We also discussed patient he will to his lower back.  A prescription for naproxen 500 mg/mL twice per day for management of his discomfort was submitted to his pharmacy.  - naproxen (NAPROSYN) 500 MG tablet; Take 1 tablet (500 mg) by mouth 2 times daily (with meals).    External hemorrhoids  Patient has had longstanding issues with external hemorrhoids, and he did defer examination of his rectal region today.  A prescription for Anusol HC with instruction to apply topically to the affected area twice per day was submitted to his pharmacy.  Patient was encouraged to ensure he is drinking plenty of liquids and has plenty of fiber in his diet.  He was also encouraged to avoid prolonged sitting on toilet.  - hydrocortisone, Perianal, (HYDROCORTISONE) 2.5 % cream; Place rectally 2 times daily as needed for hemorrhoids.            See Patient Instructions    Alison Segal is a 44 year old, presenting for the following health issues:  Back Pain        10/11/2024     8:25 AM   Additional Questions   Roomed by hope r   Accompanied by self         10/11/2024     8:25 AM   Patient Reported  Additional Medications   Patient reports taking the following new medications no     Patient is a 44-year-old male who presents to the with a chief complaint of back pain.  Patient reports that he has been intermittently for the past several weeks.  His pain is located lumbar region.  This pain is nonradiating, exacerbated by lifting and bending at the waist.  He is not taking medication for management of his discomfort.  Patient denies any traumatic event or injury that triggered his pain.  He does state that his job requires frequent lifting.  He also has a history of hypertension, but he has been off of medication for the past several years.  He was previously on amlodipine 10 mg/day.  Patient states that it did not seem to help with his blood pressure, but he had difficulties with swelling in his feet.  He did stop the medication, but he did not follow-up with his primary care physician.  He had previously been on Zestoretic and metoprolol for management of his blood pressure.  Patient states that this medications did not work.  He does have a history of exposure with occasional episodes of blood in the stool.  Patient was previously prescribed a topical medication, it is reported that she could refill this medication at this time.  He does report some issues with perirectal itching and burning.    History of Present Illness       Reason for visit:  To see nicolás   He is taking medications regularly.         Pain History:  When did you first notice your pain? 2 weeks ago    Have you seen anyone else for your pain? No  How has your pain affected your ability to work? Pain does not limit ability to work   What type of work do you or did you do?    Where in your body do you have pain? Back Pain  Onset/Duration: 2 weeks ago   Description:   Location of pain: low back bilateral  Character of pain: burning and waxing and waning  Pain radiation: none  New numbness or weakness in legs, not attributed to pain:  no   Intensity: Currently 2/10, mild  Progression of Symptoms: same and waxing and waning  History:   Specific cause: lifting, turning/bending, work-related  Pain interferes with job: No  History of back problems: no prior back problems  Any previous MRI or X-rays: None  Sees a specialist for back pain: No  Alleviating factors:   Improved by: heat and rest    Precipitating factors:  Worsened by: Lifting, Bending, Standing, and pushing   Therapies tried and outcome: heat    Accompanying Signs & Symptoms:  Risk of Fracture: None  Risk of Cauda Equina: None  Risk of Infection: None  Risk of Cancer: None  Risk of Ankylosing Spondylitis: Onset at age <35, male, AND morning back stiffness  no         Review of Systems  CONSTITUTIONAL: NEGATIVE for fever, chills, change in weight  INTEGUMENTARY/SKIN: NEGATIVE for worrisome rashes, moles or lesions  ENT/MOUTH: NEGATIVE for ear, mouth and throat problems  RESP: NEGATIVE for significant cough or SOB  CV: NEGATIVE for chest pain, palpitations or peripheral edema  GI: NEGATIVE for nausea, abdominal pain, heartburn, or change in bowel habits.  Positive for occasional blood in stool.  MUSCULOSKELETAL: Positive for back pain.  NEURO: NEGATIVE for weakness, dizziness or paresthesias      Objective    BP (!) 174/110 (BP Location: Right arm, Patient Position: Sitting, Cuff Size: Adult Regular)   Pulse 104   Temp 97.8  F (36.6  C) (Oral)   Resp 18   Wt 73.6 kg (162 lb 3.2 oz)   SpO2 97%   BMI 25.22 kg/m    Body mass index is 25.22 kg/m .  Physical Exam  Vitals reviewed.   Constitutional:       Appearance: Normal appearance.   HENT:      Head: Normocephalic and atraumatic.      Mouth/Throat:      Mouth: Mucous membranes are moist.      Pharynx: Oropharynx is clear.   Eyes:      Extraocular Movements: Extraocular movements intact.      Conjunctiva/sclera: Conjunctivae normal.      Pupils: Pupils are equal, round, and reactive to light.   Cardiovascular:      Rate and Rhythm:  Normal rate and regular rhythm.      Pulses: Normal pulses.      Heart sounds: Normal heart sounds.   Pulmonary:      Effort: Pulmonary effort is normal.      Breath sounds: Normal breath sounds.   Abdominal:      General: Bowel sounds are normal.      Palpations: Abdomen is soft.   Musculoskeletal:      Cervical back: Normal.      Thoracic back: Normal.      Lumbar back: Tenderness present. Negative right straight leg raise test and negative left straight leg raise test.        Back:    Skin:     General: Skin is warm and dry.      Capillary Refill: Capillary refill takes less than 2 seconds.   Neurological:      General: No focal deficit present.      Mental Status: He is alert and oriented to person, place, and time.        Diagnostic testing: CMP and CBC are pending.        Signed Electronically by: Josep Shipman MD

## 2024-10-11 NOTE — LETTER
October 14, 2024      Philipp Gupta  [address]  [facility] [address]        Dear ,    We are writing to inform you of your test results.    Your recent, nonfasting labs showed no concerning findings in regards to his electrolytes, kidney function, blood counts, and liver enzymes.  I would recommend that he proceed with the losartan for management of his blood pressure as discussed during his recent visit.     Resulted Orders   Comprehensive metabolic panel (BMP + Alb, Alk Phos, ALT, AST, Total. Bili, TP)   Result Value Ref Range    Sodium 140 135 - 145 mmol/L    Potassium 3.5 3.4 - 5.3 mmol/L    Carbon Dioxide (CO2) 28 22 - 29 mmol/L    Anion Gap 9 7 - 15 mmol/L    Urea Nitrogen 10.7 6.0 - 20.0 mg/dL    Creatinine 0.95 0.67 - 1.17 mg/dL    GFR Estimate >90 >60 mL/min/1.73m2      Comment:      eGFR calculated using 2021 CKD-EPI equation.    Calcium 9.3 8.8 - 10.4 mg/dL      Comment:      Reference intervals for this test were updated on 7/16/2024 to reflect our healthy population more accurately. There may be differences in the flagging of prior results with similar values performed with this method. Those prior results can be interpreted in the context of the updated reference intervals.    Chloride 103 98 - 107 mmol/L    Glucose 136 (H) 70 - 99 mg/dL    Alkaline Phosphatase 64 40 - 150 U/L    AST 20 0 - 45 U/L    ALT 12 0 - 70 U/L    Protein Total 7.6 6.4 - 8.3 g/dL    Albumin 4.8 3.5 - 5.2 g/dL    Bilirubin Total 1.0 <=1.2 mg/dL   CBC with platelets and differential   Result Value Ref Range    WBC Count 5.6 4.0 - 11.0 10e3/uL    RBC Count 5.43 4.40 - 5.90 10e6/uL    Hemoglobin 16.6 13.3 - 17.7 g/dL    Hematocrit 45.9 40.0 - 53.0 %    MCV 85 78 - 100 fL    MCH 30.6 26.5 - 33.0 pg    MCHC 36.2 31.5 - 36.5 g/dL    RDW 12.5 10.0 - 15.0 %    Platelet Count 121 (L) 150 - 450 10e3/uL    % Neutrophils 70 %    % Lymphocytes 21 %    % Monocytes 7 %    % Eosinophils 2 %    % Basophils 1 %    % Immature  Granulocytes 0 %    Absolute Neutrophils 3.9 1.6 - 8.3 10e3/uL    Absolute Lymphocytes 1.2 0.8 - 5.3 10e3/uL    Absolute Monocytes 0.4 0.0 - 1.3 10e3/uL    Absolute Eosinophils 0.1 0.0 - 0.7 10e3/uL    Absolute Basophils 0.0 0.0 - 0.2 10e3/uL    Absolute Immature Granulocytes 0.0 <=0.4 10e3/uL       If you have any questions or concerns, please call the clinic at the number listed above.       Sincerely,      Josep Shipman MD

## 2024-10-11 NOTE — PATIENT INSTRUCTIONS
Will proceed with a trial of losartan 50 mg by mouth daily for his blood pressure.  Patient also received prescription for paroxetine for his back pain.

## 2024-12-24 ENCOUNTER — OFFICE VISIT (OUTPATIENT)
Dept: URGENT CARE | Facility: URGENT CARE | Age: 44
End: 2024-12-24
Payer: COMMERCIAL

## 2024-12-24 ENCOUNTER — ANCILLARY PROCEDURE (OUTPATIENT)
Dept: GENERAL RADIOLOGY | Facility: CLINIC | Age: 44
End: 2024-12-24
Attending: PHYSICIAN ASSISTANT
Payer: COMMERCIAL

## 2024-12-24 ENCOUNTER — HOSPITAL ENCOUNTER (EMERGENCY)
Facility: CLINIC | Age: 44
Discharge: HOME | End: 2024-12-24
Attending: EMERGENCY MEDICINE
Payer: COMMERCIAL

## 2024-12-24 VITALS
HEART RATE: 109 BPM | OXYGEN SATURATION: 99 % | BODY MASS INDEX: 26.99 KG/M2 | TEMPERATURE: 98.2 F | RESPIRATION RATE: 18 BRPM | WEIGHT: 162 LBS | DIASTOLIC BLOOD PRESSURE: 121 MMHG | HEIGHT: 65 IN | SYSTOLIC BLOOD PRESSURE: 206 MMHG

## 2024-12-24 VITALS
HEART RATE: 70 BPM | DIASTOLIC BLOOD PRESSURE: 121 MMHG | OXYGEN SATURATION: 98 % | SYSTOLIC BLOOD PRESSURE: 215 MMHG | TEMPERATURE: 97.5 F | BODY MASS INDEX: 25.03 KG/M2 | WEIGHT: 161 LBS

## 2024-12-24 DIAGNOSIS — I10 UNCONTROLLED HYPERTENSION: Primary | ICD-10-CM

## 2024-12-24 DIAGNOSIS — M19.112 POST-TRAUMATIC OSTEOARTHRITIS OF LEFT SHOULDER: ICD-10-CM

## 2024-12-24 DIAGNOSIS — I10 HYPERTENSION, UNSPECIFIED TYPE: ICD-10-CM

## 2024-12-24 DIAGNOSIS — M25.512 ACUTE PAIN OF LEFT SHOULDER: ICD-10-CM

## 2024-12-24 LAB
ANION GAP SERPL CALCULATED.3IONS-SCNC: 12 MMOL/L (ref 7–15)
ANION GAP SERPL CALCULATED.3IONS-SCNC: 5 MMOL/L (ref 3–14)
BASOPHILS # BLD AUTO: 0 10E3/UL (ref 0–0.2)
BASOPHILS NFR BLD AUTO: 0 %
BUN SERPL-MCNC: 7.3 MG/DL (ref 6–20)
BUN SERPL-MCNC: 8 MG/DL (ref 7–30)
CALCIUM SERPL-MCNC: 9.3 MG/DL (ref 8.8–10.4)
CALCIUM SERPL-MCNC: 9.4 MG/DL (ref 8.5–10.1)
CHLORIDE BLD-SCNC: 105 MMOL/L (ref 94–109)
CHLORIDE SERPL-SCNC: 99 MMOL/L (ref 98–107)
CO2 SERPL-SCNC: 32 MMOL/L (ref 20–32)
CREAT SERPL-MCNC: 0.75 MG/DL (ref 0.67–1.17)
CREAT SERPL-MCNC: 0.8 MG/DL (ref 0.66–1.25)
EGFRCR SERPLBLD CKD-EPI 2021: >90 ML/MIN/1.73M2
EGFRCR SERPLBLD CKD-EPI 2021: >90 ML/MIN/1.73M2
EOSINOPHIL # BLD AUTO: 0 10E3/UL (ref 0–0.7)
EOSINOPHIL NFR BLD AUTO: 0 %
ERYTHROCYTE [DISTWIDTH] IN BLOOD BY AUTOMATED COUNT: 12.3 % (ref 10–15)
GLUCOSE BLD-MCNC: 123 MG/DL (ref 70–99)
GLUCOSE SERPL-MCNC: 139 MG/DL (ref 70–99)
HCO3 SERPL-SCNC: 27 MMOL/L (ref 22–29)
HCT VFR BLD AUTO: 46 % (ref 40–53)
HGB BLD-MCNC: 16.7 G/DL (ref 13.3–17.7)
IMM GRANULOCYTES # BLD: 0 10E3/UL
IMM GRANULOCYTES NFR BLD: 0 %
LYMPHOCYTES # BLD AUTO: 0.8 10E3/UL (ref 0.8–5.3)
LYMPHOCYTES NFR BLD AUTO: 9 %
MCH RBC QN AUTO: 30.3 PG (ref 26.5–33)
MCHC RBC AUTO-ENTMCNC: 36.3 G/DL (ref 31.5–36.5)
MCV RBC AUTO: 83 FL (ref 78–100)
MONOCYTES # BLD AUTO: 0.4 10E3/UL (ref 0–1.3)
MONOCYTES NFR BLD AUTO: 5 %
NEUTROPHILS # BLD AUTO: 7.5 10E3/UL (ref 1.6–8.3)
NEUTROPHILS NFR BLD AUTO: 86 %
NRBC # BLD AUTO: 0 10E3/UL
NRBC BLD AUTO-RTO: 0 /100
PLATELET # BLD AUTO: 146 10E3/UL (ref 150–450)
POTASSIUM BLD-SCNC: 3.9 MMOL/L (ref 3.4–5.3)
POTASSIUM SERPL-SCNC: 3.9 MMOL/L (ref 3.4–5.3)
RBC # BLD AUTO: 5.52 10E6/UL (ref 4.4–5.9)
SODIUM SERPL-SCNC: 138 MMOL/L (ref 135–145)
SODIUM SERPL-SCNC: 142 MMOL/L (ref 135–145)
TROPONIN T SERPL HS-MCNC: 7 NG/L
TROPONIN T SERPL HS-MCNC: 7 NG/L
WBC # BLD AUTO: 8.8 10E3/UL (ref 4–11)

## 2024-12-24 PROCEDURE — 36415 COLL VENOUS BLD VENIPUNCTURE: CPT | Performed by: PHYSICIAN ASSISTANT

## 2024-12-24 PROCEDURE — 93000 ELECTROCARDIOGRAM COMPLETE: CPT | Performed by: PHYSICIAN ASSISTANT

## 2024-12-24 PROCEDURE — 93005 ELECTROCARDIOGRAM TRACING: CPT

## 2024-12-24 PROCEDURE — 250N000013 HC RX MED GY IP 250 OP 250 PS 637: Performed by: EMERGENCY MEDICINE

## 2024-12-24 PROCEDURE — 85004 AUTOMATED DIFF WBC COUNT: CPT | Performed by: EMERGENCY MEDICINE

## 2024-12-24 PROCEDURE — 82374 ASSAY BLOOD CARBON DIOXIDE: CPT | Performed by: EMERGENCY MEDICINE

## 2024-12-24 PROCEDURE — 80048 BASIC METABOLIC PNL TOTAL CA: CPT | Performed by: PHYSICIAN ASSISTANT

## 2024-12-24 PROCEDURE — 73030 X-RAY EXAM OF SHOULDER: CPT | Mod: TC | Performed by: STUDENT IN AN ORGANIZED HEALTH CARE EDUCATION/TRAINING PROGRAM

## 2024-12-24 PROCEDURE — 99284 EMERGENCY DEPT VISIT MOD MDM: CPT | Performed by: EMERGENCY MEDICINE

## 2024-12-24 PROCEDURE — 36415 COLL VENOUS BLD VENIPUNCTURE: CPT | Performed by: EMERGENCY MEDICINE

## 2024-12-24 PROCEDURE — 71046 X-RAY EXAM CHEST 2 VIEWS: CPT | Mod: TC | Performed by: RADIOLOGY

## 2024-12-24 PROCEDURE — 99213 OFFICE O/P EST LOW 20 MIN: CPT | Performed by: PHYSICIAN ASSISTANT

## 2024-12-24 PROCEDURE — 80048 BASIC METABOLIC PNL TOTAL CA: CPT | Performed by: EMERGENCY MEDICINE

## 2024-12-24 PROCEDURE — 84484 ASSAY OF TROPONIN QUANT: CPT | Performed by: EMERGENCY MEDICINE

## 2024-12-24 RX ORDER — LOSARTAN POTASSIUM 100 MG/1
100 TABLET ORAL DAILY
Qty: 30 TABLET | Refills: 0 | Status: SHIPPED | OUTPATIENT
Start: 2024-12-24

## 2024-12-24 RX ORDER — IBUPROFEN 600 MG/1
600 TABLET, FILM COATED ORAL ONCE
Status: COMPLETED | OUTPATIENT
Start: 2024-12-24 | End: 2024-12-24

## 2024-12-24 RX ADMIN — IBUPROFEN 600 MG: 600 TABLET, FILM COATED ORAL at 13:22

## 2024-12-24 ASSESSMENT — ACTIVITIES OF DAILY LIVING (ADL)
ADLS_ACUITY_SCORE: 41

## 2024-12-24 ASSESSMENT — COLUMBIA-SUICIDE SEVERITY RATING SCALE - C-SSRS
6. HAVE YOU EVER DONE ANYTHING, STARTED TO DO ANYTHING, OR PREPARED TO DO ANYTHING TO END YOUR LIFE?: NO
1. IN THE PAST MONTH, HAVE YOU WISHED YOU WERE DEAD OR WISHED YOU COULD GO TO SLEEP AND NOT WAKE UP?: NO
2. HAVE YOU ACTUALLY HAD ANY THOUGHTS OF KILLING YOURSELF IN THE PAST MONTH?: NO

## 2024-12-24 NOTE — PROGRESS NOTES
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     Philipp Gupta is a 44 year old male presents with a chief complaint of left shoulder pain. Vague onset worsening over the last few days lifting heavy items at work. Blood pressure is markedly elevated. Patient has tried multiple medication, without success. Has been on Losartan for 6 weeks, and blood pressure continues to climb.  Denies chest pain, change in vision, shortness of breath, swelling.     Past Medical History:   Diagnosis Date    Hypertension      Current Outpatient Medications   Medication Sig Dispense Refill    losartan (COZAAR) 50 MG tablet Take 1 tablet (50 mg) by mouth daily. 90 tablet 0    hydrocortisone, Perianal, (HYDROCORTISONE) 2.5 % cream Place rectally 2 times daily as needed for hemorrhoids. (Patient not taking: Reported on 12/24/2024) 30 g 1    naproxen (NAPROSYN) 500 MG tablet Take 1 tablet (500 mg) by mouth 2 times daily (with meals). (Patient not taking: Reported on 12/24/2024) 60 tablet 0     Social History     Tobacco Use    Smoking status: Never    Smokeless tobacco: Never   Substance Use Topics    Alcohol use: No       ROS:  Review of systems negative except as stated above.    EXAM:   BP (!) 215/121 (BP Location: Right arm, Patient Position: Sitting, Cuff Size: Adult Regular)   Pulse 70   Temp 97.5  F (36.4  C) (Tympanic)   Wt 73 kg (161 lb)   SpO2 98%   BMI 25.03 kg/m    Gen: healthy,alert,no distress  Extremity:ROM intact  GENERAL APPEARANCE: healthy, alert and no distress  CHEST: clear to auscultation  CV: regular rate and rhythm  NEURO: Normal strength and tone, sensory exam grossly normal, mentation intact and speech normal    XR: WNL  EKG: SINUS  Results for orders placed or performed in visit on 12/24/24   XR Shoulder Left G/E 3 Views     Status: None    Narrative    XR SHOULDER LEFT  G/E 3 VIEWS 12/24/2024 10:51 AM     HISTORY: Post-traumatic osteoarthritis of left shoulder    COMPARISON: 2/19/2009       Impression    IMPRESSION:  No fracture or malalignment. Mild acromioclavicular degenerative  change. Glenohumeral joint space appears maintained.     TOAN OLMEDO MD         SYSTEM ID:  [id number]   Results for orders placed or performed in visit on 12/24/24   XR Chest 2 Views     Status: None (Preliminary result)    Narrative    CHEST TWO VIEWS  12/24/2024 10:36 AM     HISTORY: Posttraumatic osteoarthritis of left shoulder.    COMPARISON: 9/19/2005.      Impression    IMPRESSION: No acute cardiopulmonary disease.   Results for orders placed or performed in visit on 12/24/24   Basic metabolic panel  (Ca, Cl, CO2, Creat, Gluc, K, Na, BUN)     Status: Abnormal   Result Value Ref Range    Sodium 142 135 - 145 mmol/L    Potassium 3.9 3.4 - 5.3 mmol/L    Chloride 105 94 - 109 mmol/L    Carbon Dioxide (CO2) 32 20 - 32 mmol/L    Anion Gap 5 3 - 14 mmol/L    Urea Nitrogen 8 7 - 30 mg/dL    Creatinine 0.80 0.66 - 1.25 mg/dL    GFR Estimate >90 >60 mL/min/1.73m2    Calcium 9.4 8.5 - 10.1 mg/dL    Glucose 123 (H) 70 - 99 mg/dL         ASSESSMENT:   (I10) Uncontrolled hypertension  (primary encounter diagnosis)  Comment: hypertensive urgency  Plan: XR Chest 2 Views, EKG 12-lead complete w/read -        Clinics, XR Shoulder Left G/E 3 Views, Basic         metabolic panel  (Ca, Cl, CO2, Creat, Gluc, K,         Na, BUN), Orthopedic  Referral      (M25.512) Acute pain of left shoulder  Comment: appears to be musculoskeletal given acute on chronic history   Plan: XR Chest 2 Views, EKG 12-lead complete w/read -        Clinics, XR Shoulder Left G/E 3 Views, Basic         metabolic panel  (Ca, Cl, CO2, Creat, Gluc, K,         Na, BUN), Orthopedic  Referral

## 2024-12-24 NOTE — ED TRIAGE NOTES
"Pt went to clinic today for left shoulder pain. 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 Does not remember a specific injury but states his shoulder has hurt for a week or more and causes him to sleep poorly. Pt was found to have a very elevated blood pressure. Has been on meds for a \"long time\" states his dose was changed in September or October and states \"its still not working\".      Triage Assessment (Adult)       Row Name 12/24/24 1232          Triage Assessment    Airway WDL WDL        Respiratory WDL    Respiratory WDL WDL        Skin Circulation/Temperature WDL    Skin Circulation/Temperature WDL WDL        Cardiac WDL    Cardiac WDL WDL        Peripheral/Neurovascular WDL    Peripheral Neurovascular WDL WDL        Cognitive/Neuro/Behavioral WDL    Cognitive/Neuro/Behavioral WDL WDL                     "

## 2024-12-24 NOTE — LETTER
December 24, 2024      Philipp Gupta  [address] [facility] [address]  [facility] [place] [address]        To Whom It May Concern:    Philipp Gupta  was seen on 12/24.  Please excuse him from lifting, pulling due to injury for 3-5 days until follow up with orthopedics.        Sincerely,        Agapito Lynne PA-C    Electronically signed

## 2024-12-24 NOTE — ED PROVIDER NOTES
"  Emergency Department Note      History of Present Illness     Chief Complaint   Hypertension      HPI   Philipp Gupta is a 44 year old male who presents to the emergency department with left shoulder pain.  Patient reports that he has had ongoing shoulder pain since last week.  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  He has not taken anything for pain.  Denies any fevers, falls etc.  Denies any chest pain, shortness breath, nausea, weakness numbness or tingling.  Reports that he went to urgent care today and they noted that his blood pressure is elevated.  States that he has been on several medications that \"do not work\".  Reports compliance with medication.  Is taking 50 of losartan.  Most recently saw his primary doctor in October.  Has not been back to have his blood pressure reevaluated.    Independent Historian   None    Review of External Notes   Reviewed urgent care evaluation from 12/24/2024 including chest x-ray read, x-ray of the shoulder read, BMP.  Reviewed primary care note from 8/31/2021 regarding uncontrolled blood pressure, echocardiogram from 8/31/2021 showing normal EF, normal left ventricular wall thickness no significant changes.    Past Medical History     Medical History and Problem List   Past Medical History:   Diagnosis Date    Hypertension        Medications   losartan (COZAAR) 100 MG tablet  hydrocortisone, Perianal, (HYDROCORTISONE) 2.5 % cream  losartan (COZAAR) 50 MG tablet  naproxen (NAPROSYN) 500 MG tablet        Surgical History   No past surgical history on file.    Physical Exam     Patient Vitals for the past 24 hrs:   BP Temp Temp src Pulse Resp SpO2 Height Weight   12/24/24 1755 (!) 206/121 -- -- 109 18 99 % -- --   12/24/24 1313 (!) 186/118 -- -- 85 -- -- -- --   12/24/24 1236 (!) 196/121 98.2  F (36.8  C) Temporal 81 20 98 % 1.651 m (5' 5\") 73.5 kg (162 lb)     Physical Exam  General: " Resting on the bed.  Head: No obvious trauma to head.  Ears, Nose, Throat:  External ears normal.  Nose normal.    Eyes:  Conjunctivae clear.    CV: Regular rate and rhythm.  No murmurs.      Respiratory: Effort normal and breath sounds normal.  No wheezing or crackles.   Gastrointestinal: Soft.  No distension. There is no tenderness.  There is no rigidity, no rebound and no guarding.   Musculoskeletal: Left shoulder with some tenderness to the lateral aspect as well as posterior aspect near the AC joint.  Range of motion is intact, full flexion, extension, adduction, and abduction.  No erythema, warmth or induration.   5-5.  Sensation tact light touch.  2+ radial pulses.  Neuro: Alert. Moving all extremities appropriately.  Normal speech.    Skin: Skin is warm and dry.  No rash noted.       Diagnostics     Lab Results   Labs Ordered and Resulted from Time of ED Arrival to Time of ED Departure   BASIC METABOLIC PANEL - Abnormal       Result Value    Sodium 138      Potassium 3.9      Chloride 99      Carbon Dioxide (CO2) 27      Anion Gap 12      Urea Nitrogen 7.3      Creatinine 0.75      GFR Estimate >90      Calcium 9.3      Glucose 139 (*)    CBC WITH PLATELETS AND DIFFERENTIAL - Abnormal    WBC Count 8.8      RBC Count 5.52      Hemoglobin 16.7      Hematocrit 46.0      MCV 83      MCH 30.3      MCHC 36.3      RDW 12.3      Platelet Count 146 (*)     % Neutrophils 86      % Lymphocytes 9      % Monocytes 5      % Eosinophils 0      % Basophils 0      % Immature Granulocytes 0      NRBCs per 100 WBC 0      Absolute Neutrophils 7.5      Absolute Lymphocytes 0.8      Absolute Monocytes 0.4      Absolute Eosinophils 0.0      Absolute Basophils 0.0      Absolute Immature Granulocytes 0.0      Absolute NRBCs 0.0     TROPONIN T, HIGH SENSITIVITY - Normal    Troponin T, High Sensitivity 7     TROPONIN T, HIGH SENSITIVITY - Normal    Troponin T, High Sensitivity 7         Imaging   No orders to display       EKG    ECG results from 12/24/24   EKG 12 lead     Value    Systolic Blood Pressure     Diastolic Blood Pressure     Ventricular Rate 86    Atrial Rate 86    WA Interval 156    QRS Duration 80        QTc 430    P Axis 64    R AXIS 64    T Axis 95    Interpretation ECG      Sinus rhythm  Nonspecific ST and T wave abnormality  Abnormal ECG  When compared with ECG of 19-Feb-2008 16:42,  No significant change was found  Interpreted by Dr. Lazo at 1243.       Independent Interpretation   CXR: No pneumothorax, infiltrate, pleural effusion, or pulmonary edema.    ED Course      Medications Administered   Medications   ibuprofen (ADVIL/MOTRIN) tablet 600 mg (600 mg Oral $Given 12/24/24 1322)       Procedures   Procedures     Discussion of Management   Clinical Pharmacist, discussed blood pressure medication management.    ED Course   ED Course as of 12/24/24 2221 Tue Dec 24, 2024   1305 I obtained history and examined the patient as noted above.     1818 I reassessed the patient.  He continues to be hypertensive but completely asymptomatic.  At this point plan for medication adjustment and cardiology follow-up.       Additional Documentation  None    Medical Decision Making / Diagnosis     CMS Diagnoses: None    MIPS       None    MDM   Philipp Berlin Gupta is a 44 year old male who presents emerged department with high blood pressure.  Vital signs notable for hypertensive.  Otherwise unremarkable.  Broad differential was considered including but not limited to electrolyte, metabolic and renal abnormality, hypertensive urgency or emergency, ACS, arrhythmia, dissection, stroke, subarachnoid, etc.  Patient is completely asymptomatic.  CBC shows no leukocytosis or significant anemia.  Patient has known thrombocytopenia.  BMP without acute electrolyte, metabolic or renal dysfunction.  EKG shows sinus rhythm.  No acute ischemic change.  Patient has no chest pain or shortness of breath.  Troponin x 2 was normal.  At this time  I see no evidence of ACS or arrhythmia.  No thunderclap headache, nonfocal neurologic exam, no signs of stroke or subarachnoid.  No indication for neuroimaging.  Reviewed chest x-ray from outside facility no widening of the mediastinum no tearing pain, no clinical evidence of dissection.  Patient has been referred for additional workup for ongoing hypertension.  In reviewing his chart it appears that he has not followed up with this.  At present time I see no evidence of hypertensive urgency or emergency.  I discussed with our clinical pharmacist.  Recommendation is to increase patient's losartan to 100 mg to fully optimize this medication.  I have referred him to cardiology for ongoing blood pressure management given how difficult it is to control his blood pressures.  He will see orthopedics for his shoulder pain which seems to be unrelated and more musculoskeletal in nature.  We have discussed return precautions including but not limited to chest pain, shortness of breath, weakness numbness or tingling, headaches.  Discussed importance of close follow-up.    Disposition   The patient was discharged.     Diagnosis     ICD-10-CM    1. Hypertension, unspecified type  I10 Adult Cardiology al [facility] Referral           Discharge Medications   Discharge Medication List as of 12/24/2024  6:19 PM        START taking these medications    Details   !! losartan (COZAAR) 100 MG tablet Take 1 tablet (100 mg) by mouth daily., Disp-30 tablet, R-0, Local Print       !! - Potential duplicate medications found. Please discuss with provider.            Lamar Lazo MD    Scribe Disclosure:  I, Angela Shore, am serving as a scribe at 1:32 PM on 12/24/2024 to document services personally performed by Lamar Lazo MD based on my observations and the provider's statements to me.         Lamar Lazo MD  12/24/24 [id number]

## 2024-12-25 NOTE — DISCHARGE INSTRUCTIONS
I recommend that you follow-up with your primary doctor and cardiology to continue to better manage your blood pressure.  Please increase your losartan to 100 mg daily.  Return to the ER if having chest pain, shortness of breath, weakness numbness tingling, severe headaches or other concerns.  It is important to follow-up closely to better manage the blood pressure long-term as this can have long-term health effects.    Discharge Instructions  Hypertension - High Blood Pressure    During you visit to the Emergency Department, your blood pressure was higher than the recommended blood pressure.  This may be related to stress, pain, medication or other temporary conditions. In these cases, your blood pressure may return to normal on its own. If you have a history of high blood pressure, you may need to have your provider adjust your medications. Sometimes, your high measurement here may indicate that you have developed high blood pressure that will stay high unless it is treated. As a general rule, high blood pressure causes problems over years rather than days, weeks, or months. So, while it is important to treat blood pressure, it is rarely important to treat blood pressure immediately. Occasionally we will begin a medication in the Emergency Department; more often we will recommend close follow-up for medications with a primary doctor/clinic.    Generally, every Emergency Department visit should have a follow-up clinic visit with either a primary or a specialty clinic/provider. Please follow-up as instructed by your emergency provider today.    Return to the Emergency Department if you start to have:  A severe headache.  Chest pain.  Shortness of breath.  Weakness or numbness that affects one part of the body.  Confusion.  Vision changes.  Significant swelling of legs and/or eyes.  A reaction to any medication started in the Emergency Department.    What can I do to help myself?  Avoid alcohol.  Take any blood  pressure medicine that you are prescribed.  Get a good night s sleep.  Lower your salt intake.  Exercise.  Lose weight.  Manage stress.  See your doctor regularly    If blood pressure medication was started in the Emergency Department:  The medicine may not have an immediate effect. The body and brain determine what blood pressure you have. The medicine s job is to retrain the body s  thermostat  to a lower blood pressure.  You will need to follow up with your provider to see how this medicine is working for you.  If you were given a prescription for medicine here today, be sure to read all of the information (including the package insert) that comes with your prescription.  This will include important information about the medicine, its side effects, and any warnings that you need to know about.  The pharmacist who fills the prescription can provide more information and answer questions you may have about the medicine.  If you have questions or concerns that the pharmacist cannot address, please call or return to the Emergency Department.   Remember that you can always come back to the Emergency Department if you are not able to see your regular provider in the amount of time listed above, if you get any new symptoms, or if there is anything that worries you.

## 2024-12-26 LAB
ATRIAL RATE - MUSE: 86 BPM
DIASTOLIC BLOOD PRESSURE - MUSE: NORMAL MMHG
INTERPRETATION ECG - MUSE: NORMAL
P AXIS - MUSE: 64 DEGREES
PR INTERVAL - MUSE: 156 MS
QRS DURATION - MUSE: 80 MS
QT - MUSE: 360 MS
QTC - MUSE: 430 MS
R AXIS - MUSE: 64 DEGREES
SYSTOLIC BLOOD PRESSURE - MUSE: NORMAL MMHG
T AXIS - MUSE: 95 DEGREES
VENTRICULAR RATE- MUSE: 86 BPM

## 2025-01-02 ENCOUNTER — OFFICE VISIT (OUTPATIENT)
Dept: ORTHOPEDICS | Facility: CLINIC | Age: 45
End: 2025-01-02
Payer: OTHER MISCELLANEOUS

## 2025-01-02 VITALS
HEART RATE: 98 BPM | SYSTOLIC BLOOD PRESSURE: 213 MMHG | DIASTOLIC BLOOD PRESSURE: 118 MMHG | BODY MASS INDEX: 24.92 KG/M2 | WEIGHT: 158.8 LBS | HEIGHT: 67 IN

## 2025-01-02 DIAGNOSIS — I10 UNCONTROLLED HYPERTENSION: ICD-10-CM

## 2025-01-02 DIAGNOSIS — M54.12 CERVICAL RADICULOPATHY: Primary | ICD-10-CM

## 2025-01-02 DIAGNOSIS — M25.512 ACUTE PAIN OF LEFT SHOULDER: ICD-10-CM

## 2025-01-02 RX ORDER — NAPROXEN 500 MG/1
500 TABLET ORAL 2 TIMES DAILY WITH MEALS
Qty: 20 TABLET | Refills: 0 | Status: SHIPPED | OUTPATIENT
Start: 2025-01-02

## 2025-01-02 RX ORDER — METHYLPREDNISOLONE 4 MG/1
TABLET ORAL
Qty: 21 TABLET | Refills: 0 | Status: SHIPPED | OUTPATIENT
Start: 2025-01-02 | End: 2025-01-02 | Stop reason: SINTOL

## 2025-01-02 NOTE — LETTER
1/2/2025      Philipp Gupta  [address]  [facility] [address]      Dear Colleague,    Thank you for referring your patient, Philipp Gupta, to the [facility] [place]. Please see a copy of my visit note below.    ASSESSMENT & PLAN    Philipp was seen today for pain.    Diagnoses and all orders for this visit:    Cervical radiculopathy  -     Discontinue: methylPREDNISolone (MEDROL DOSEPAK) 4 MG tablet therapy pack; Follow Package Directions  -     naproxen (NAPROSYN) 500 MG tablet; Take 1 tablet (500 mg) by mouth 2 times daily (with meals).  -     Physical Therapy  Referral; Future    Uncontrolled hypertension  -     Orthopedic  Referral    Acute pain of left shoulder  -     Orthopedic  Referral  -     naproxen (NAPROSYN) 500 MG tablet; Take 1 tablet (500 mg) by mouth 2 times daily (with meals).        45 year old male presents with acute left posterior shoulder pain, unsure if any inciting injury or event that started while he was at work.  On exam, he has a positive Spurling test and pain is reproduced with neck motion, however shoulder motion is relatively benign.  We discussed that I suspect his symptoms are coming from a cervical radiculopathy rather than shoulder.  Of note, he has significantly elevated blood pressure in clinic today, was seen recently in the ER for this.  He denies any chest pain, shortness of breath, headache and we discussed that if he develops any symptoms he should report directly to the ER.  Repeat manual blood pressure was 205/110.    Plan:  -Naproxen 500mg twice daily x7 days with food. Will avoid steroids due to BP elevation.  Encouraged patient to check his blood pressure regularly  -Start PT and home exercise program   -Start home stretches as early as possible  -Follow up with cardiology for BP. If you develop headache, shortness of breath or chest pain, go to the ER  -If no improvement, can send to medical spine  "to discuss injections  -Return in 2 weeks to evaluate for return to work     Dr. Solange Reich DO, CAQ  [facility]  Sports Medicine     -----  Chief Complaint   Patient presents with     Left Shoulder - Pain       SUBJECTIVE  Philipp Gupta is a/an 45 year old male who is seen in consultation at the request of  Agapito Lynne PA-C for evaluation of left shoulder pain. 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   Pain is located over the lateral left shoulder and when turning neck, he has pain in neck. Symptoms are worsened by sleeping laying on side. He has tried no treatment to date. Associated symptoms include: none. Denies numbness/tingling and instability.    The patient is seen alone    Prior injury/Surgical history of affected joint: no injury to that joint  Social History/Occupation: housekeeping [employer]    REVIEW OF SYSTEMS:  Pertinent positives/negative: As stated above in HPI    OBJECTIVE:  BP (!) 213/118   Pulse 98   Ht 1.702 m (5' 7\")   Wt 72 kg (158 lb 12.8 oz)   BMI 24.87 kg/m     General: Alert and in no distress  CV: Extremities appear well perfused   Resp: normal respiratory effort  MSK:  Neck Exam:  ROM-limited to right rotation and sidebending secondary to pain and stiffness  Tenderness to palpation of: Left levator scapula, left trapezius, left cervical paraspinals    Upper Extremity Strength: Intact in C4-T1 myotomes bilaterally  Upper Extremity Sensation: Intact to light touch in C5-T1 dermatomes  Spurling's: Positive on the left  Left shoulder range of motion full and does not reproduce symptoms       RADIOLOGY:  Final results and radiologist's interpretation available in the [facility] health record.  Images below were personally reviewed and discussed with the patient in the " office today.  My personal interpretation of the performed imaging: X-ray of the left shoulder from 12/24/2024 reveals no acute osseous abnormalities, mild degenerative changes of the AC joint.                 Disclaimer: This note consists of text and symbols derived from dictation and/or voice recognition software. As a result, there may be errors in the script that have gone undetected. Please consider this when interpreting information found in this chart.        Again, thank you for allowing me to participate in the care of your patient.        Sincerely,        Solange Reich, DO    Electronically signed

## 2025-01-02 NOTE — PROGRESS NOTES
ASSESSMENT & PLAN    Philipp was seen today for pain.    Diagnoses and all orders for this visit:    Cervical radiculopathy  -     Discontinue: methylPREDNISolone (MEDROL DOSEPAK) 4 MG tablet therapy pack; Follow Package Directions  -     naproxen (NAPROSYN) 500 MG tablet; Take 1 tablet (500 mg) by mouth 2 times daily (with meals).  -     Physical Therapy  Referral; Future    Uncontrolled hypertension  -     Orthopedic  Referral    Acute pain of left shoulder  -     Orthopedic  Referral  -     naproxen (NAPROSYN) 500 MG tablet; Take 1 tablet (500 mg) by mouth 2 times daily (with meals).        45 year old male presents with acute left posterior shoulder pain, unsure if any inciting injury or event that started while he was at work.  On exam, he has a positive Spurling test and pain is reproduced with neck motion, however shoulder motion is relatively benign.  We discussed that I suspect his symptoms are coming from a cervical radiculopathy rather than shoulder.  Of note, he has significantly elevated blood pressure in clinic today, was seen recently in the ER for this.  He denies any chest pain, shortness of breath, headache and we discussed that if he develops any symptoms he should report directly to the ER.  Repeat manual blood pressure was 205/110.    Plan:  -Naproxen 500mg twice daily x7 days with food. Will avoid steroids due to BP elevation.  Encouraged patient to check his blood pressure regularly  -Start PT and home exercise program   -Start home stretches as early as possible  -Follow up with cardiology for BP. If you develop headache, shortness of breath or chest pain, go to the ER  -If no improvement, can send to medical spine to discuss injections  -Return in 2 weeks to evaluate for return to work     Dr. Solange Reich, DO, CAQ  [facility]  Sports Medicine     -----  Chief Complaint   Patient presents with    Left Shoulder - Pain  "      SUBJECTIVE  Philipp Gupta is a/an 45 year old male who is seen in consultation at the request of  Agapito Lynne PA-C for evaluation of left shoulder pain. 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   Pain is located over the lateral left shoulder and when turning neck, he has pain in neck. Symptoms are worsened by sleeping laying on side. He has tried no treatment to date. Associated symptoms include: none. Denies numbness/tingling and instability.    The patient is seen alone    Prior injury/Surgical history of affected joint: no injury to that joint  Social History/Occupation: housekeeping [employer]    REVIEW OF SYSTEMS:  Pertinent positives/negative: As stated above in HPI    OBJECTIVE:  BP (!) 213/118   Pulse 98   Ht 1.702 m (5' 7\")   Wt 72 kg (158 lb 12.8 oz)   BMI 24.87 kg/m     General: Alert and in no distress  CV: Extremities appear well perfused   Resp: normal respiratory effort  MSK:  Neck Exam:  ROM-limited to right rotation and sidebending secondary to pain and stiffness  Tenderness to palpation of: Left levator scapula, left trapezius, left cervical paraspinals    Upper Extremity Strength: Intact in C4-T1 myotomes bilaterally  Upper Extremity Sensation: Intact to light touch in C5-T1 dermatomes  Spurling's: Positive on the left  Left shoulder range of motion full and does not reproduce symptoms       RADIOLOGY:  Final results and radiologist's interpretation available in the [facility] health record.  Images below were personally reviewed and discussed with the patient in the office today.  My personal interpretation of the performed imaging: X-ray of the left shoulder from 12/24/2024 reveals no acute osseous abnormalities, mild degenerative changes of the AC joint.                 Disclaimer: This note consists of " text and symbols derived from dictation and/or voice recognition software. As a result, there may be errors in the script that have gone undetected. Please consider this when interpreting information found in this chart.

## 2025-01-02 NOTE — PATIENT INSTRUCTIONS
1. Cervical radiculopathy    2. Uncontrolled hypertension    3. Acute pain of left shoulder        Plan:  -Naproxen 500mg twice daily x7 days with food. Will avoid steroids due to BP   -Start PT and home exercise program   -Start home stretches as early as possible  -Follow up with cardiology for BP. If you develop headache or chest pain, go to the ER  -If no improvement, can send to medical spine to discuss injections  -Return in 2 weeks to evaluate for return to work     If you had imaging performed/ordered at your appointment today, you can expect to see your radiology results in [employer] within approximately 48 business hours.     If you have questions/concerns after your appointment, please send my team a [employer] message or call the clinic at [phone].     Dr. Solange Reich, , [facility]  Sports Medicine and Orthopedics    Dr. Reich's Clinic Locations and Contact Numbers:   [place] APPOINTMENTS: [phone]      [address] [facility] RADIOLOGY: [phone]   [place] [address] PHYSICAL THERAPY: [phone]    HAND THERAPY/OT: [phone]   [place] BILLING QUESTIONS: [phone] [address] FAX: [phone]   [place] [address]

## 2025-01-06 ENCOUNTER — THERAPY VISIT (OUTPATIENT)
Dept: PHYSICAL THERAPY | Facility: CLINIC | Age: 45
End: 2025-01-06
Attending: STUDENT IN AN ORGANIZED HEALTH CARE EDUCATION/TRAINING PROGRAM

## 2025-01-06 DIAGNOSIS — M54.2 NECK PAIN: Primary | ICD-10-CM

## 2025-01-06 DIAGNOSIS — M54.12 CERVICAL RADICULOPATHY: ICD-10-CM

## 2025-01-06 PROCEDURE — 97110 THERAPEUTIC EXERCISES: CPT | Mod: GP | Performed by: PHYSICAL THERAPIST

## 2025-01-06 PROCEDURE — 97112 NEUROMUSCULAR REEDUCATION: CPT | Mod: GP | Performed by: PHYSICAL THERAPIST

## 2025-01-06 PROCEDURE — 97161 PT EVAL LOW COMPLEX 20 MIN: CPT | Mod: GP | Performed by: PHYSICAL THERAPIST

## 2025-01-06 NOTE — PROGRESS NOTES
PHYSICAL THERAPY EVALUATION  Type of Visit: Evaluation              Subjective Pt. complains of left shoulder pain that has been present for 1 month.  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   Patient s chief complaints: Left shoulder pain with neck rotation.  Symptoms are exacerbated by cervical extension and rotation.  Symptoms are relieved by heat and neck movements.   Current function restrictions: Turning his head while driving and looking over her shoulders.  Previous functional status as reported by patient: Unlimited.    Notes from referring provider:    Left shoulder pain for unknown reasons. MD note states suspect cervical radiculopathy d/t negative shoulder eval. 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 Worse with turning neck and lying on side.    45 year old male presents with acute left posterior shoulder pain, unsure if any inciting injury or event that started while he was at work.  On exam, he has a positive Spurling test and pain is reproduced with neck motion, however shoulder motion is relatively benign.  We discussed that I suspect his symptoms are coming from a cervical radiculopathy rather than shoulder.          Presenting condition or subjective complaint: (Patient-Rptd) neck  Date of onset:      Relevant medical history: (Patient-Rptd) High blood pressure   Dates & types of surgery:      Prior diagnostic imaging/testing results: (Patient-Rptd) X-ray     Prior therapy history for the same diagnosis, illness or injury: (Patient-Rptd) No      Prior Level of Function  Transfers: Independent  Ambulation: Independent  ADL: Independent  IADL:     Living Environment  Social support: (Patient-Rptd) With family members   Type of home: (Patient-Rptd) Apartment/condo   Stairs to enter the home:         Ramp: (Patient-Rptd) No   Stairs inside the home: (Patient-Rptd) Yes (Patient-Rptd) 2 Is there a railing: (Patient-Rptd)  No     Help at home: (Patient-Rptd) None  Equipment owned:       Employment:      Hobbies/Interests:      Patient goals for therapy: (Patient-Rptd) sleep    Pain assessment:      Objective       Assessment & Plan   CLINICAL IMPRESSIONS  Medical Diagnosis:      Treatment Diagnosis:     Impression/Assessment: Patient is a 45 year old male with left neck/shoulder complaints.  The following significant findings have been identified: Pain, Decreased ROM/flexibility, Impaired muscle performance, and Decreased activity tolerance. These impairments interfere with their ability to perform self care tasks, work tasks, and recreational activities as compared to previous level of function.     Clinical Decision Making (Complexity):  Clinical Presentation: Stable/Uncomplicated  Clinical Presentation Rationale: based on medical and personal factors listed in PT evaluation  Clinical Decision Making (Complexity): Low complexity    PLAN OF CARE  Treatment Interventions:  Interventions: Manual Therapy, Neuromuscular Re-education, Therapeutic Activity, Therapeutic Exercise, Self-Care/Home Management    Long Term Goals            Frequency of Treatment:    Duration of Treatment:      Recommended Referrals to Other Professionals:   Education Assessment:        Risks and benefits of evaluation/treatment have been explained.   Patient/Family/caregiver agrees with Plan of Care.     Evaluation Time:           Signing Clinician: Sybil Chappell, PT

## 2025-01-13 ENCOUNTER — THERAPY VISIT (OUTPATIENT)
Dept: PHYSICAL THERAPY | Facility: CLINIC | Age: 45
End: 2025-01-13
Attending: STUDENT IN AN ORGANIZED HEALTH CARE EDUCATION/TRAINING PROGRAM
Payer: OTHER MISCELLANEOUS

## 2025-01-13 DIAGNOSIS — M54.2 NECK PAIN: Primary | ICD-10-CM

## 2025-01-13 PROCEDURE — 97530 THERAPEUTIC ACTIVITIES: CPT | Mod: GP | Performed by: PHYSICAL THERAPIST

## 2025-01-13 PROCEDURE — 97110 THERAPEUTIC EXERCISES: CPT | Mod: GP | Performed by: PHYSICAL THERAPIST

## 2025-01-14 NOTE — PROGRESS NOTES
"ASSESSMENT & PLAN    Philipp was seen today for follow up and pain.    Diagnoses and all orders for this visit:    Cervical radiculopathy    Acute pain of left shoulder    Uncontrolled hypertension        45-year-old male presents to follow-up on left cervical radiculopathy after a work-related injury.  He reports significant improvement in symptoms with oral anti-inflammatories and physical therapy and feels like he is ready to go back to work at light duty.    Plan:  -Return to work with light duty for 2 weeks, then advance to full duty   -Continue PT and home exercise program   -Naproxen as needed for pain  -If no improvement, consider sending to spine for injections   -Follow up as needed or if symptoms worsen or fail to improve      Dr. Solange Reich, DO  [facility]  Sports Medicine     -----  Chief Complaint   Patient presents with    Left Shoulder - Follow Up    Neck - Pain       SUBJECTIVE  Philipp Gupta is a/an 45 year old male who is seen to follow-up on left posterior shoulder pain. The patient was last seen 01/02/2025. Since last visit, he has done 2 physical therapy visits and is taking Naproxen 500mg. States feeling much better since starting physical Therapy.    The patient is seen alone      REVIEW OF SYSTEMS:  Pertinent positives/negative: As stated above in HPI    OBJECTIVE:  BP (!) 179/121   Ht 1.702 m (5' 7\")   Wt 73.1 kg (161 lb 3.2 oz)   BMI 25.25 kg/m     General: Alert and in no distress  Skin: no visable rashes  CV: Extremities appear well perfused   Resp: normal respiratory effort, no conversational dyspnea   Psych: normal mood, affect  MSK:  Neck Exam:  ROM- Full to flexion/extension, sidebending, and rotation  Tenderness to palpation of: Left trapezius    Upper Extremity Strength: Grossly intact in C4-T1 myotomes bilaterally  Spurling's: +on left, but improved from prior         RADIOLOGY:  Final results and radiologist's interpretation available in the " [facility] health record.  Images below were personally reviewed and discussed with the patient in the office today.  My personal interpretation of the performed imaging: No new imaging                 Disclaimer: This note consists of symbols derived from dictation and/or voice recognition software. As a result, there may be errors in the script that have gone undetected. Please consider this when interpreting information found in this chart.

## 2025-01-16 ENCOUNTER — TELEPHONE (OUTPATIENT)
Dept: ORTHOPEDICS | Facility: CLINIC | Age: 45
End: 2025-01-16
Payer: COMMERCIAL

## 2025-01-16 ENCOUNTER — OFFICE VISIT (OUTPATIENT)
Dept: ORTHOPEDICS | Facility: CLINIC | Age: 45
End: 2025-01-16
Payer: OTHER MISCELLANEOUS

## 2025-01-16 VITALS
BODY MASS INDEX: 25.3 KG/M2 | WEIGHT: 161.2 LBS | DIASTOLIC BLOOD PRESSURE: 121 MMHG | HEIGHT: 67 IN | SYSTOLIC BLOOD PRESSURE: 179 MMHG

## 2025-01-16 DIAGNOSIS — M54.12 CERVICAL RADICULOPATHY: Primary | ICD-10-CM

## 2025-01-16 DIAGNOSIS — M25.512 ACUTE PAIN OF LEFT SHOULDER: ICD-10-CM

## 2025-01-16 DIAGNOSIS — I10 UNCONTROLLED HYPERTENSION: ICD-10-CM

## 2025-01-16 NOTE — LETTER
January 16, 2025      Philipp Gupta  [address] [facility] [address]  [place] [address]        To Whom It May Concern:    Philipp Gupta was seen in our clinic. He may return to work with the following: limited to light duty - no pulling/pushing for 2 weeks on or about 1/16/25, then can resume full duty.      Sincerely,        Dr. Solange Reich, DO  Sports Medicine

## 2025-01-16 NOTE — LETTER
"1/16/2025      Philipp Gupta  [address]  [place] [address]      Dear Colleague,    Thank you for referring your patient, Philipp Gupta, to the [facility] SP[facility]TS MEDICINE CLINIC [place]. Please see a copy of my visit note below.    ASSESSMENT & PLAN    Philipp was seen today for follow up and pain.    Diagnoses and all orders for this visit:    Cervical radiculopathy    Acute pain of left shoulder    Uncontrolled hypertension        45-year-old male presents to follow-up on left cervical radiculopathy after a work-related injury.  He reports significant improvement in symptoms with oral anti-inflammatories and physical therapy and feels like he is ready to go back to work at light duty.    Plan:  -Return to work with light duty for 2 weeks, then advance to full duty   -Continue PT and home exercise program   -Naproxen as needed for pain  -If no improvement, consider sending to spine for injections   -Follow up as needed or if symptoms worsen or fail to improve      Dr. Solange Reich, DO  [facility]  Sports Medicine     -----  Chief Complaint   Patient presents with     Left Shoulder - Follow Up     Neck - Pain       SUBJECTIVE  Philipp Gupta is a/an 45 year old male who is seen to follow-up on left posterior shoulder pain. The patient was last seen 01/02/2025. Since last visit, he has done 2 physical therapy visits and is taking Naproxen 500mg. States feeling much better since starting physical Therapy.    The patient is seen alone      REVIEW OF SYSTEMS:  Pertinent positives/negative: As stated above in HPI    OBJECTIVE:  BP (!) 179/121   Ht 1.702 m (5' 7\")   Wt 73.1 kg (161 lb 3.2 oz)   BMI 25.25 kg/m     General: Alert and in no distress  Skin: no visable rashes  CV: Extremities appear well perfused   Resp: normal respiratory effort, no conversational dyspnea   Psych: normal mood, affect  MSK:  Neck Exam:  ROM- Full to flexion/extension, sidebending, " and rotation  Tenderness to palpation of: Left trapezius    Upper Extremity Strength: Grossly intact in C4-T1 myotomes bilaterally  Spurling's: +on left, but improved from prior         RADIOLOGY:  Final results and radiologist's interpretation available in the [facility] health record.  Images below were personally reviewed and discussed with the patient in the office today.  My personal interpretation of the performed imaging: No new imaging                 Disclaimer: This note consists of symbols derived from dictation and/or voice recognition software. As a result, there may be errors in the script that have gone undetected. Please consider this when interpreting information found in this chart.        Again, thank you for allowing me to participate in the care of your patient.        Sincerely,        Solange Reich, DO    Electronically signed

## 2025-01-16 NOTE — LETTER
January 16, 2025      Philipp Gupta  [address]  [place] [place] [address]        To Whom It May Concern:    Philipp Gupta  was seen on 1/16/25 to follow up on his current work related injury. Please excuse him from work duties until 1/30/25 due to injury.        Sincerely,        Solange Reich, DO    Electronically signed

## 2025-01-16 NOTE — PATIENT INSTRUCTIONS
1. Cervical radiculopathy    2. Acute pain of left shoulder    3. Uncontrolled hypertension        Plan:  -Return to work with light duty for 2 weeks, then advance to full duty   -Continue PT and home exercise program   -Naproxen as needed for pain  -If no improvement, consider sending to spine for injections   -Follow up as needed or if symptoms worsen or fail to improve    If you had imaging performed/ordered at your appointment today, you can expect to see your radiology results in [facility] within approximately 48 business hours.     If you have questions/concerns after your appointment, please send my team a [employer] message or call the clinic at [phone].     Dr. Solange Reich, , [facility]  Sports Medicine and Orthopedics    Dr. Reich's Clinic Locations and Contact Numbers:   [place] APPOINTMENTS: [phone]      [address] [facility] RADIOLOGY: [phone]   [place] [address] PHYSICAL THERAPY: [phone]    HAND THERAPY/OT: [phone]   [place] BILLING QUESTIONS: [phone] [address] FAX: [phone]   [place] [address]

## 2025-01-16 NOTE — TELEPHONE ENCOUNTER
"Outbound call to patient to discuss. Patient states that work will not accommodate light duty, and he is unsure of what to do. Writer attempted to inquire if the patient needs more time off of work, or if he would like to go back to work full time, and patient stated that \"it's up to the doctor\".    Huddled with Dr. Reich to discuss, and per Dr. Reich - \"the patient needs to decide, it is not up to me\".    Outbound call to patient to discuss. Patient stated it was up to Dr. Reich, and writer explained multiple times that he needs to decide if he can return to work or not. Patient decided he would like an additional 1-2 weeks off of work. Writer advised him he will need to come in after that time frame to be evaluated and returned to work, and he stated understanding. Patient would like to  letter at clinic .    Encounter sent to Dr. Reich for pended letter to be reviewed and signed, upon completion writer will take it to the  for patient .    Jennifer Low, JOST, LAT, ATC   "

## 2025-01-16 NOTE — TELEPHONE ENCOUNTER
[facility] Call Center    Phone Message    May a detailed message be left on voicemail: yes     Reason for Call: Patient asking for Call Back. About Work Note. Employer Stated No Light Work. Patient asking for Clinic to call Him . THANKS    Action Taken: Message routed to:  Other: [facility] SP[facility]TS MEDIC[facility] DOCTORS     Travel Screening: Not Applicable     Date of Service:

## 2025-01-20 ENCOUNTER — THERAPY VISIT (OUTPATIENT)
Dept: PHYSICAL THERAPY | Facility: CLINIC | Age: 45
End: 2025-01-20
Attending: STUDENT IN AN ORGANIZED HEALTH CARE EDUCATION/TRAINING PROGRAM
Payer: OTHER MISCELLANEOUS

## 2025-01-20 DIAGNOSIS — M54.2 NECK PAIN: Primary | ICD-10-CM

## 2025-01-20 PROCEDURE — 97110 THERAPEUTIC EXERCISES: CPT | Mod: GP | Performed by: PHYSICAL THERAPIST

## 2025-01-21 DIAGNOSIS — I10 ESSENTIAL HYPERTENSION: Primary | ICD-10-CM

## 2025-01-22 RX ORDER — LOSARTAN POTASSIUM 100 MG/1
100 TABLET ORAL DAILY
Qty: 30 TABLET | Refills: 0 | Status: SHIPPED | OUTPATIENT
Start: 2025-01-22

## 2025-01-29 ENCOUNTER — THERAPY VISIT (OUTPATIENT)
Dept: PHYSICAL THERAPY | Facility: CLINIC | Age: 45
End: 2025-01-29
Payer: OTHER MISCELLANEOUS

## 2025-01-29 DIAGNOSIS — M54.2 NECK PAIN: Primary | ICD-10-CM

## 2025-01-29 PROCEDURE — 97110 THERAPEUTIC EXERCISES: CPT | Mod: GP | Performed by: PHYSICAL THERAPIST

## 2025-01-29 NOTE — PROGRESS NOTES
ASSESSMENT & PLAN    Philipp was seen today for pain and follow up.    Diagnoses and all orders for this visit:    Cervical radiculopathy  -     XR Cervical Spine 2/3 vws; Future    Neck pain, acute  -     XR Cervical Spine 2/3 vws; Future      Philipp Candy is a 44yo M without significant PMH who returned to clinic today for re-assessment of his left shoulder pain. He works as a  through [employer] and his shoulder pain is a work-related injury. He continues to have left sided neck pain which radiates into the posterior shoulder, and sometimes into the arm during exercise. Pain in the arm is associated with medial forearm numbness/tingling. On exam he has a significantly positive Spurling's on the left with 4-4+/5 strength with elbow extension and dig V adduction and diminished sensation to light touch over the C6 & C7 dermatomes. His clinical presentation is consistent with a cervical radiculopathy, likely of C6-C7 dermatomes. He has been participating in PT (total of 4 sessions) and doing his HEP daily with some improvement, but would have difficulty returning to his prior work involving bending, lifting, pulling, and pushing heavy loads. We discussed potentially doing an injection for his cervical radiculopathy if his improvement continues to plateau. For now we encouraged him to return to work with light duty (avoiding excessive lifting, bending, pushing, pulling, etc.) and limiting hard physical labor to no more than 4 hours per day. Anticipate he would be able to return to full duty in ~2 weeks, and would want him to return for further evaluation and likely advanced imaging if he is unable to tolerate doing so.    Plan:  -Continue physical therapy and home exercise program   -Start work with restrictions  -If still having pain, consider getting an MRI and injections  -Follow-up as needed or for new work restrictions  -XR cervical spine on the way out today    Truong Zarco MD  Resident Physician,  PGY-3  Physical Medicine & Rehabilitation  [facility]    Patient was seen and discussed with staff attending, Dr. Reich.      -----  Chief Complaint   Patient presents with    Neck - Pain, Follow Up       SUBJECTIVE  Philipp Gupta is a/an 45 year old male who is seen to follow-up on left shoulder and neck pain. The patient was last seen 1/16/25, and reports there is not much of a difference since then. Since last visit, he has done 2 more physical therapy visits, totaling to 4. He states that he is ready to try to go back to work. He will need a note to release him to work today.    The patient is seen alone    Today, he reports only mild improvement from when he was last seen ~2 weeks ago. He continues to have pain from the left side of the neck, radiating into the shoulder with motions such as neck extension, rightward head rotation, resisted shoulder abduction, and bending forward to touch his toes. He has been participating in physical therapy (total 4 sessions now) and reports he has been doing his HEP 4-5x daily. He will at times notice a transient worsening of his symptoms including LUE numbness/tingling in the medial forearm during his HEP, but resolves shortly afterwards. He worries about being able to return to his highly physical job given his ongoing symptoms, as he must lift, push, pull, and  heavy loads of linens and is unsure if he would be able to tolerate it due to pain.      REVIEW OF SYSTEMS:  Pertinent positives/negative: As stated above in HPI    OBJECTIVE:  There were no vitals taken for this visit.   General: Alert and in no distress  Skin: no visable rashes  CV: Extremities appear well perfused   Resp: normal respiratory effort, no conversational dyspnea   Psych: normal mood, affect  MSK/Neuro:   Neck Exam:  ROM- Right neck rotation and neck extension AROM limited by pain. Reproduces pain radiating to left shoulder.  Upper Extremity Strength: strength 4+/5 with left  elbow extension, 4/5 with left dig V adduction, otherwise MS 5/5 in LUE, and 5/5 RUE in myotomes C4-T1.  Upper Extremity Sensation: diminished sensation in LUE in C6-7 dermatomes, intact sensation throughout all dermatomes in RUE  Spurling's: positive left  Reflexes: 2/4 in BUE brachioradialis, biceps, and triceps tendons   Sutton's: negative bilaterally      RADIOLOGY:  Final results and radiologist's interpretation available in the [facility] health record.  Images below were personally reviewed and discussed with the patient in the office today.    My personal interpretation of the performed imaging: X-ray of the cervical spine performed in clinic today reveals straightening of normal cervical lordosis, multilevel degenerative disc with osteophyte formation over level bilateral facet arthropathy, no acute fractures                 Disclaimer: This note consists of symbols derived from dictation and/or voice recognition software. As a result, there may be errors in the script that have gone undetected. Please consider this when interpreting information found in this chart.

## 2025-01-30 ENCOUNTER — OFFICE VISIT (OUTPATIENT)
Dept: CARDIOLOGY | Facility: CLINIC | Age: 45
End: 2025-01-30
Attending: EMERGENCY MEDICINE
Payer: COMMERCIAL

## 2025-01-30 ENCOUNTER — OFFICE VISIT (OUTPATIENT)
Dept: ORTHOPEDICS | Facility: CLINIC | Age: 45
End: 2025-01-30
Payer: OTHER MISCELLANEOUS

## 2025-01-30 VITALS
DIASTOLIC BLOOD PRESSURE: 110 MMHG | HEIGHT: 67 IN | BODY MASS INDEX: 24.8 KG/M2 | HEART RATE: 84 BPM | WEIGHT: 158 LBS | SYSTOLIC BLOOD PRESSURE: 188 MMHG

## 2025-01-30 DIAGNOSIS — M54.12 CERVICAL RADICULOPATHY: Primary | ICD-10-CM

## 2025-01-30 DIAGNOSIS — M54.2 NECK PAIN, ACUTE: ICD-10-CM

## 2025-01-30 DIAGNOSIS — I10 HYPERTENSION, UNSPECIFIED TYPE: ICD-10-CM

## 2025-01-30 RX ORDER — AMLODIPINE BESYLATE 10 MG/1
10 TABLET ORAL DAILY
Qty: 90 TABLET | Refills: 11 | Status: SHIPPED | OUTPATIENT
Start: 2025-01-30

## 2025-01-30 RX ORDER — SPIRONOLACTONE 100 MG/1
100 TABLET, FILM COATED ORAL DAILY
Qty: 90 TABLET | Refills: 11 | Status: SHIPPED | OUTPATIENT
Start: 2025-01-30

## 2025-01-30 NOTE — LETTER
January 30, 2025      Philipp Gupta  [address]  [place] [address]        To Whom It May Concern:    Philipp Gupta was seen in our clinic. He may return to work with the following: limit excessive bending/lifting and pulling/pushing, allow him to do alternative work that is less strenuous as needed, and allow breaks as needed. Allow him to leave work to attend therapy appointments, and accommodate his needs as requested on or about 2/3/24. Feel free to contact me if needed, but [employer] needs to allow accommodations as requested.  In 2 weeks, can return to full duty work.     Sincerely,          Dr. Solange Reich, DO  Sports Medicine         Electronically signed

## 2025-01-30 NOTE — Clinical Note
1/30/2025      Philipp Gupta  [address]  [facility][note 3] [id number]      Dear Colleague,    Thank you for referring your patient, Philipp Gupta, to the [facility] [place]. Please see a copy of my visit note below.    ASSESSMENT & PLAN    There are no diagnoses linked to this encounter.              Dr. Solange Reich, DO  [facility]  Sports Medicine     -----  Chief Complaint   Patient presents with    Neck - Pain, Follow Up       SUBJECTIVE  Philipp Gupta is a/an 45 year old male who is seen to follow-up on left shoulder and neck pain. The patient was last seen 1/16/25, and reports there is not much of a difference since then. Since last visit, he has done 2 more physical therapy visits, totaling to 4. He states that he is ready to try to go back to work. He will need a note to release him to work today.    The patient is seen alone      REVIEW OF SYSTEMS:  Pertinent positives/negative: As stated above in HPI    OBJECTIVE:  There were no vitals taken for this visit.   General: Alert and in no distress  Skin: no visable rashes  CV: Extremities appear well perfused   Resp: normal respiratory effort, no conversational dyspnea   Psych: normal mood, affect  MSK:  ***     RADIOLOGY:  Final results and radiologist's interpretation available in the [facility] health record.  Images below were personally reviewed and discussed with the patient in the office today.  My personal interpretation of the performed imaging: ***      {[facility] 2021 Documentation (Optional):903364}  {2021 E&M time (Optional):058224}  {Provider  Link to [facility] Help Grid :509691}       Disclaimer: This note consists of symbols derived from dictation and/or voice recognition software. As a result, there may be errors in the script that have gone undetected. Please consider this when interpreting information found in this chart.        Again, thank you for allowing me to participate in the  care of your patient.        Sincerely,        Solange Reich, DO    Electronically signed

## 2025-01-30 NOTE — PROGRESS NOTES
HISTORY OF PRESENT ILLNESS:  Philipp Gupta a 45 year old man, was evaluated in consultation at the request of his primary care physician and the [facility] emergency room for treatment of hypertension    The patient has a longstanding history of hypertension that dates more than 10 years.  In 2019, he underwent screening for secondary causes of hypertension that included screening for hyperaldosteronism and pheochromocytoma however imaging for renal artery stenosis was never completed.  The patient has been on a number of different blood pressure medications, all of which he states have been ineffective although there was 1 agent that was effective but resulted in leg and heel aching which resulted in his stopping the medications.  Review of his charts indicates that he has been on combined lisinopril/hydrochlorothiazide and most recently had been trialed on losartan up to 150 mg daily which had not affected his blood pressure.    The patient is unaware of any family history of hypertension, does not abuse alcohol, or use illicit drugs.  He states he has been compliant with his medical therapy but disappointed that it has been ineffective.  He follows no special diet or exercise program.  His most recent blood pressures  recorded in his primary care  clinic between 10/2024 and today have ranged between 174-215 systolic over 110-121 diastolic.  His blood pressure today, while on losartan 150 mg daily.    His most recent ECG from 12/2024 shows a sinus rhythm with ST-T wave changes.  His most recent echocardiogram from 2021,   which I have personally reviewed, showed NORMAL left-ventricular wall thickness with mild dilatation of the ascending aorta.  His most recent BMP from December 2024 showed a creatinine of 0.75 with a GFR greater than 90.  His most recent urinalysis from 10/23/2019 showed normal urine albumin.        Orders this Visit:  No orders of the defined types were placed in this encounter.    No  "orders of the defined types were placed in this encounter.    There are no discontinued medications.    Encounter Diagnosis   Name Primary?    Hypertension, unspecified type        CURRENT MEDICATIONS:  Current Outpatient Medications   Medication Sig Dispense Refill    hydrocortisone, Perianal, (HYDROCORTISONE) 2.5 % cream Place rectally 2 times daily as needed for hemorrhoids. 30 g 1    losartan (COZAAR) 100 MG tablet TAKE ONE TABLET BY MOUTH ONCE DAILY 30 tablet 0    losartan (COZAAR) 50 MG tablet Take 1 tablet (50 mg) by mouth daily. (Patient not taking: Reported on 1/30/2025) 90 tablet 0    naproxen (NAPROSYN) 500 MG tablet Take 1 tablet (500 mg) by mouth 2 times daily (with meals). (Patient not taking: Reported on 1/30/2025) 20 tablet 0    naproxen (NAPROSYN) 500 MG tablet Take 1 tablet (500 mg) by mouth 2 times daily (with meals). (Patient not taking: Reported on 1/30/2025) 60 tablet 0       ALLERGIES   No Known Allergies    PAST MEDICAL, SURGICAL, FAMILY, SOCIAL HISTORY:  History was reviewed and updated as needed, see medical record.    The patient is from [facility].  Works in housekeeping.  He follows no special diet nor does he have a special exercise program.    Review of Systems:  A 12-point review of systems was completed, see medical record for detailed review of systems information.    Physical Exam:  Vitals: BP (!) 188/110 (BP Location: Right arm, Patient Position: Sitting, Cuff Size: Adult Regular)   Pulse 84   Ht 1.702 m (5' 7\")   Wt 71.7 kg (158 lb)   BMI 24.75 kg/m      Constitutional: No apparent distress cooperative, pleasant,    HEENT: pupils equal round reactive to light, thyroid normal size, JVP is normal    Chest: Clear to percussion and auscultation    Cardiac: Normal S1, normal S2 no S3, no murmur or click pulses are full and symmetrical in the carotid, radial, brachial, femoral, popliteal, there is no pulse delay.    Abdomen: Scaphoid.  Liver percusses to 6 cm spleen is not palpable " aorta is not tender or enlarged no bruits.    Extremitiies: no edema.    Neurological:  Cranial nerves II through XII are intact, strength equal and symmetrical, displays normal insight and judgment    ASSESSMENT: The patient has severe, stage II longstanding hypertension and other than ST-T wave changes on his ECG, there are no findings on physical examination or on laboratory testing to suggest significant endorgan disease.  There is been no repeat screening for secondary causes of hypertension since his initial evaluation and he has not undergone renal artery imaging.  The patient states he has been compliant with all previous medical therapy and there is no history of substance abuse or chronic NSAID use.  From review of his records, it appears he has not been responsive to ACE inhibitors/ARB agents as a single drug therapies.  I have reviewed the benefits of a Mediterranean-style limited salt consumption diet, exercise, and the need to monitor his blood pressure at home with his only device measured at the level of the elbow.  I have explained that many patients with longstanding essential hypertension will require multiple medical agents for control.  I am surprised at the paucity of evidence of endorgan disease despite multiple years of hypertension and will be interested to assess his blood pressure control when measured at home.    RECOMMENDATIONS:   1) screen for secondary causes of hypertension  2) TTE to reassess LV wall thickness aorta  3) Obtain home blood pressure monitoring device  4) Mediterranean style limited sodium diet  5) regular exercise program  6) amlodipine 10 mg daily  7) spironolactone 100 mg daily  8) follow up blood pressure with nurse visit in 2 weeks        Recent Lab Results:  LIPID RESULTS:  Lab Results   Component Value Date    CHOL 149 08/31/2021    CHOL 125 10/22/2019    HDL 54 08/31/2021    HDL 39 (L) 10/22/2019    LDL 75 08/31/2021    LDL 66 10/22/2019    TRIG 99 08/31/2021     "TRIG 99 10/22/2019    CHOLHDLRATIO 3.8 09/29/2011       LIVER ENZYME RESULTS:  Lab Results   Component Value Date    AST 20 10/11/2024    AST 9 10/22/2019    ALT 12 10/11/2024    ALT 20 10/22/2019       CBC RESULTS:  Lab Results   Component Value Date    WBC 8.8 12/24/2024    WBC 8.2 10/22/2019    RBC 5.52 12/24/2024    RBC 5.01 10/22/2019    HGB 16.7 12/24/2024    HGB 15.3 10/22/2019    HCT 46.0 12/24/2024    HCT 42.9 10/22/2019    MCV 83 12/24/2024    MCV 86 10/22/2019    MCH 30.3 12/24/2024    MCH 30.5 10/22/2019    MCHC 36.3 12/24/2024    MCHC 35.7 10/22/2019    RDW 12.3 12/24/2024    RDW 13.2 10/22/2019     (L) 12/24/2024     (L) 10/22/2019       BMP RESULTS:  Lab Results   Component Value Date     12/24/2024     10/22/2019    POTASSIUM 3.9 12/24/2024    POTASSIUM 3.9 12/24/2024    POTASSIUM 3.4 10/22/2019    CHLORIDE 99 12/24/2024    CHLORIDE 105 12/24/2024    CHLORIDE 106 10/22/2019    CO2 27 12/24/2024    CO2 32 12/24/2024    CO2 27 10/22/2019    ANIONGAP 12 12/24/2024    ANIONGAP 5 12/24/2024    ANIONGAP 6 10/22/2019     (H) 12/24/2024     (H) 12/24/2024    GLC 89 10/22/2019    BUN 7.3 12/24/2024    BUN 8 12/24/2024    BUN 11 10/22/2019    CR 0.75 12/24/2024    CR 0.85 10/22/2019    GFRESTIMATED >90 12/24/2024    GFRESTIMATED >90 10/22/2019    GFRESTBLACK >90 10/22/2019    KAYLA 9.3 12/24/2024    KAYLA 8.4 (L) 10/22/2019        A1C RESULTS:  Lab Results   Component Value Date    A1C 4.7 10/22/2019       INR RESULTS:  No results found for: \"INR\"    We greatly appreciate the opportunity to be involved in the care of your patient, Philipp Gupta.    Sincerely,  Cole Rollins MD      CC  Lamar Lazo MD  [facility]  [address] [place] [address]                                                                     "

## 2025-01-30 NOTE — PATIENT INSTRUCTIONS
1. Cervical radiculopathy    2. Neck pain, acute        Plan:  -Continue physical therapy and home exercise program   -Start work with restrictions  -If still having pain, consider getting an MRI and injections  -Follow-up as needed or for new work restrictions  -XR cervical spine on the way out today    If you had imaging performed/ordered at your appointment today, you can expect to see your radiology results in [facility] within approximately 48 business hours.     If you have questions/concerns after your appointment, please send my team a [facility] message or call the clinic at [phone].     Dr. Solange Reich DO, [facility]  Sports Medicine and Orthopedics    Dr. Reich's Clinic Locations and Contact Numbers:   [place] APPOINTMENTS: [phone]      [address] [facility] RADIOLOGY: [phone]   [place] [address] PHYSICAL THERAPY: [phone]    HAND THERAPY/OT: [phone]   [place] BILLING QUESTIONS: [phone] [address] FAX: [phone]   [place] [address]

## 2025-01-30 NOTE — LETTER
"1/30/2025    [facility] [address] [place] [address]    RE: Philipp Gupta       Dear Colleague,     I had the pleasure of seeing Philipp Gupta in the [facility].  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 Lidia not a regular program No special   no kids no surgery  10 years ago    Orders this Visit:  No orders of the defined types were placed in this encounter.    No orders of the defined types were placed in this encounter.    There are no discontinued medications.    Encounter Diagnosis   Name Primary?     Hypertension, unspecified type        CURRENT MEDICATIONS:  Current Outpatient Medications   Medication Sig Dispense Refill     hydrocortisone, Perianal, (HYDROCORTISONE) 2.5 % cream Place rectally 2 times daily as needed for hemorrhoids. 30 g 1     losartan (COZAAR) 100 MG tablet TAKE ONE TABLET BY MOUTH ONCE DAILY 30 tablet 0     losartan (COZAAR) 50 MG tablet Take 1 tablet (50 mg) by mouth daily. (Patient not taking: Reported on 1/30/2025) 90 tablet 0     naproxen (NAPROSYN) 500 MG tablet Take 1 tablet (500 mg) by mouth 2 times daily (with meals). (Patient not taking: Reported on 1/30/2025) 20 tablet 0     naproxen (NAPROSYN) 500 MG tablet Take 1 tablet (500 mg) by mouth 2 times daily (with meals). (Patient not taking: Reported on 1/30/2025) 60 tablet 0       ALLERGIES   No Known Allergies    PAST MEDICAL, SURGICAL, FAMILY, SOCIAL HISTORY:  History was reviewed and updated as needed, see medical record.        Review of Systems:  A 12-point review of systems was completed, see medical record for detailed review of systems information.    Physical Exam:  Vitals: BP (!) 188/110 (BP Location: Right arm, Patient Position: Sitting, Cuff Size: Adult Regular)   Pulse 84   Ht 1.702 m (5' 7\")   Wt 71.7 " kg (158 lb)   BMI 24.75 kg/m      Constitutional: No apparent distress    HEENT: pupils equal round reactive to light, thyroid normal size, JVP is normal    Chest: Clear to percussion and auscultation    Cardiac: Normal S1, normal S2 no S3, no murmur or click    Abdomen: Scaphoid.  Liver percusses to 6 cm spleen is not palpable aorta is not tender or enlarged    Extremitiies: no edema.    Neurological:  Cranial nerves II through XII are intact, strength equal and symmetrical, displays normal insight and judgment        ASSESSMENT: Philipp Gupta is a 45 year old male with          We reviewed the benefits of a regular exercise program, a Mediterranean-style weight loss diet, and contacting us for any changes in between now and the time of her next visit.     RECOMMENDATIONS:   1) screen for secondary causes of hypertension  2) TTE to reassess LV wall thickness aorta  3) Obtain home blood pressure monitoring device  4) Mediterranean style limited sodium diet  5) regular exercise program  6) amlodipine 10 mg daily  7) spironolactone 100 mg daily  8) follow up blood pressure with nurse visit in 2 weeks        Recent Lab Results:  LIPID RESULTS:  Lab Results   Component Value Date    CHOL 149 08/31/2021    CHOL 125 10/22/2019    HDL 54 08/31/2021    HDL 39 (L) 10/22/2019    LDL 75 08/31/2021    LDL 66 10/22/2019    TRIG 99 08/31/2021    TRIG 99 10/22/2019    CHOLHDLRATIO 3.8 09/29/2011       LIVER ENZYME RESULTS:  Lab Results   Component Value Date    AST 20 10/11/2024    AST 9 10/22/2019    ALT 12 10/11/2024    ALT 20 10/22/2019       CBC RESULTS:  Lab Results   Component Value Date    WBC 8.8 12/24/2024    WBC 8.2 10/22/2019    RBC 5.52 12/24/2024    RBC 5.01 10/22/2019    HGB 16.7 12/24/2024    HGB 15.3 10/22/2019    HCT 46.0 12/24/2024    HCT 42.9 10/22/2019    MCV 83 12/24/2024    MCV 86 10/22/2019    MCH 30.3 12/24/2024    MCH 30.5 10/22/2019    MCHC 36.3 12/24/2024    MCHC 35.7 10/22/2019    RDW 12.3  "12/24/2024    RDW 13.2 10/22/2019     (L) 12/24/2024     (L) 10/22/2019       BMP RESULTS:  Lab Results   Component Value Date     12/24/2024     10/22/2019    POTASSIUM 3.9 12/24/2024    POTASSIUM 3.9 12/24/2024    POTASSIUM 3.4 10/22/2019    CHLORIDE 99 12/24/2024    CHLORIDE 105 12/24/2024    CHLORIDE 106 10/22/2019    CO2 27 12/24/2024    CO2 32 12/24/2024    CO2 27 10/22/2019    ANIONGAP 12 12/24/2024    ANIONGAP 5 12/24/2024    ANIONGAP 6 10/22/2019     (H) 12/24/2024     (H) 12/24/2024    GLC 89 10/22/2019    BUN 7.3 12/24/2024    BUN 8 12/24/2024    BUN 11 10/22/2019    CR 0.75 12/24/2024    CR 0.85 10/22/2019    GFRESTIMATED >90 12/24/2024    GFRESTIMATED >90 10/22/2019    GFRESTBLACK >90 10/22/2019    KAYLA 9.3 12/24/2024    KAYLA 8.4 (L) 10/22/2019        A1C RESULTS:  Lab Results   Component Value Date    A1C 4.7 10/22/2019       INR RESULTS:  No results found for: \"INR\"    We greatly appreciate the opportunity to be involved in the care of your patient, Philipp Gupta.    Sincerely,  Cole Rollins MD      CC  Lamar Lazo MD  [facility]  [address] [place] [address]                                                                       Thank you for allowing me to participate in the care of your patient.      Sincerely,     Cole Rollins MD     [facility]  cc:   Lamar Lazo MD  [facility]  [facility] [place] [address]      "

## 2025-02-05 ENCOUNTER — THERAPY VISIT (OUTPATIENT)
Dept: PHYSICAL THERAPY | Facility: CLINIC | Age: 45
End: 2025-02-05

## 2025-02-05 DIAGNOSIS — M54.2 NECK PAIN: Primary | ICD-10-CM

## 2025-02-05 PROCEDURE — 97110 THERAPEUTIC EXERCISES: CPT | Mod: GP | Performed by: PHYSICAL THERAPIST

## 2025-02-12 ENCOUNTER — THERAPY VISIT (OUTPATIENT)
Dept: PHYSICAL THERAPY | Facility: CLINIC | Age: 45
End: 2025-02-12
Payer: OTHER MISCELLANEOUS

## 2025-02-12 DIAGNOSIS — M54.2 NECK PAIN: Primary | ICD-10-CM

## 2025-02-12 PROCEDURE — 97110 THERAPEUTIC EXERCISES: CPT | Mod: GP | Performed by: PHYSICAL THERAPIST

## 2025-02-12 PROCEDURE — 97530 THERAPEUTIC ACTIVITIES: CPT | Mod: GP | Performed by: PHYSICAL THERAPIST

## 2025-02-28 ENCOUNTER — ANCILLARY PROCEDURE (OUTPATIENT)
Dept: MRI IMAGING | Facility: CLINIC | Age: 45
End: 2025-02-28
Attending: STUDENT IN AN ORGANIZED HEALTH CARE EDUCATION/TRAINING PROGRAM
Payer: OTHER MISCELLANEOUS

## 2025-02-28 DIAGNOSIS — M79.10 TRIGGER POINT: ICD-10-CM

## 2025-02-28 DIAGNOSIS — M54.12 CERVICAL RADICULOPATHY: ICD-10-CM

## 2025-02-28 PROCEDURE — 72141 MRI NECK SPINE W/O DYE: CPT

## 2025-03-04 ENCOUNTER — TELEPHONE (OUTPATIENT)
Dept: ORTHOPEDICS | Facility: CLINIC | Age: 45
End: 2025-03-04
Payer: COMMERCIAL

## 2025-03-04 NOTE — TELEPHONE ENCOUNTER
I made an outbound phone call and spoke to the patient regarding his recent MRI study, results, and next steps.  We discussed that his recent MRI exam was limited due to motion.  There were some findings still, which might explain his symptoms such as the cervical spondylosis.  We discussed that he should still plan to follow-up with the spine doctor due to these results, to receive updated treatment options, and if indicated, that spine provider can reorder his MRI if he feels that needs to be repeated.  Patient states that he is still experiencing the same symptoms from when he saw Dr. Reich.  There is also advised that he can continue doing his home exercises or return to physical therapy.    James Smith ATC

## 2025-04-14 ENCOUNTER — LAB (OUTPATIENT)
Dept: LAB | Facility: CLINIC | Age: 45
End: 2025-04-14
Payer: COMMERCIAL

## 2025-04-14 ENCOUNTER — HOSPITAL ENCOUNTER (OUTPATIENT)
Dept: CARDIOLOGY | Facility: CLINIC | Age: 45
Discharge: HOME | End: 2025-04-14
Attending: INTERNAL MEDICINE | Admitting: INTERNAL MEDICINE
Payer: COMMERCIAL

## 2025-04-14 DIAGNOSIS — I10 HYPERTENSION, UNSPECIFIED TYPE: ICD-10-CM

## 2025-04-14 DIAGNOSIS — Z11.59 NEED FOR HEPATITIS C SCREENING TEST: ICD-10-CM

## 2025-04-14 DIAGNOSIS — Z11.4 SCREENING FOR HIV (HUMAN IMMUNODEFICIENCY VIRUS): ICD-10-CM

## 2025-04-14 LAB
ANION GAP SERPL CALCULATED.3IONS-SCNC: 12 MMOL/L (ref 7–15)
BUN SERPL-MCNC: 8.2 MG/DL (ref 6–20)
CALCIUM SERPL-MCNC: 9.5 MG/DL (ref 8.8–10.4)
CHLORIDE SERPL-SCNC: 101 MMOL/L (ref 98–107)
CREAT SERPL-MCNC: 0.92 MG/DL (ref 0.67–1.17)
EGFRCR SERPLBLD CKD-EPI 2021: >90 ML/MIN/1.73M2
GLUCOSE SERPL-MCNC: 146 MG/DL (ref 70–99)
HCO3 SERPL-SCNC: 24 MMOL/L (ref 22–29)
LVEF ECHO: NORMAL
POTASSIUM SERPL-SCNC: 4.4 MMOL/L (ref 3.4–5.3)
SODIUM SERPL-SCNC: 137 MMOL/L (ref 135–145)

## 2025-04-14 PROCEDURE — 99000 SPECIMEN HANDLING OFFICE-LAB: CPT

## 2025-04-14 PROCEDURE — 87389 HIV-1 AG W/HIV-1&-2 AB AG IA: CPT

## 2025-04-14 PROCEDURE — 84585 ASSAY OF URINE VMA: CPT

## 2025-04-14 PROCEDURE — 36415 COLL VENOUS BLD VENIPUNCTURE: CPT

## 2025-04-14 PROCEDURE — 93306 TTE W/DOPPLER COMPLETE: CPT

## 2025-04-14 PROCEDURE — 80048 BASIC METABOLIC PNL TOTAL CA: CPT

## 2025-04-14 PROCEDURE — 84244 ASSAY OF RENIN: CPT | Mod: 90

## 2025-04-14 PROCEDURE — 82384 ASSAY THREE CATECHOLAMINES: CPT | Mod: 90

## 2025-04-14 PROCEDURE — 93306 TTE W/DOPPLER COMPLETE: CPT | Mod: 26 | Performed by: INTERNAL MEDICINE

## 2025-04-14 PROCEDURE — 86803 HEPATITIS C AB TEST: CPT

## 2025-04-14 PROCEDURE — 82088 ASSAY OF ALDOSTERONE: CPT

## 2025-04-15 ENCOUNTER — TELEPHONE (OUTPATIENT)
Dept: CARDIOLOGY | Facility: CLINIC | Age: 45
End: 2025-04-15
Payer: COMMERCIAL

## 2025-04-15 DIAGNOSIS — I10 HYPERTENSION, UNSPECIFIED TYPE: Primary | ICD-10-CM

## 2025-04-15 PROBLEM — M54.2 NECK PAIN: Status: RESOLVED | Noted: 2025-01-06 | Resolved: 2025-04-15

## 2025-04-15 LAB
ALDOST SERPL-MCNC: 117 NG/DL (ref 0–31)
HCV AB SERPL QL IA: NONREACTIVE
HIV 1+2 AB+HIV1 P24 AG SERPL QL IA: NONREACTIVE
VMA RAW: 0.84 UG/TUBE
VMA/CREAT UR: 4.3 MG/G CR (ref 0–8.1)

## 2025-04-15 NOTE — TELEPHONE ENCOUNTER
Patient notified of results and Dr. Rollins recommendations for an Endocrinologist.  He states he does not have a regular PCP saw Dr. Shipman once.  Referral for Endocrinologist in Epic.  Patient verbalized understanding.  Mag Ivy RN, [phone]

## 2025-04-15 NOTE — TELEPHONE ENCOUNTER
----- Message from Cole Rollins sent at 4/15/2025 11:39 AM CDT -----  Hi team  Again, I am uncertain of this patient's follow up and if he has an internist, I would like to have this doctor address his  elevated aldosterone level, which sometimes can be a cause of resistant hypertension. I am uncertain as to whether the spironolactone is contributing to this elevation. If he has no primary  care, then I would suggest referral to an endocrinologist.

## 2025-04-16 LAB — RENIN PLAS-CCNC: 5.3 NG/ML/HR

## 2025-04-19 LAB
CATECHOLS PLAS-IMP: ABNORMAL
DOPAMINE SERPL-MCNC: 172 PMOL/L
EPINEPH PLAS-MCNC: 1295 PMOL/L
NOREPINEPH PLAS-MCNC: 6333 PMOL/L

## 2025-04-22 NOTE — CONFIDENTIAL NOTE
RECORDS RECEIVED FROM: internal    DATE RECEIVED:  7/9/25    NOTES (FOR ALL VISITS) STATUS DETAILS   OFFICE NOTES from referring provider internal    Cole Rollins MD      MEDICATION LIST internal     IMAGING      XR (Chest) internal  12.24.24   LABS     DIABETES: HBGA1C, CREATININE, FASTING LIPIDS, MICROALBUMIN URINE, POTASSIUM, TSH, T4    THYROID: TSH, T4, CBC, THYRODLONULIN, TOTAL T3, FREE T4, CALCITONIN, CEA internal  CREATININE- 4/14/25  Cbc- 12.24.24

## 2025-04-24 LAB
CREAT 24H UR-MRATE: 1708 MG/D
CREAT UR-MCNC: 67 MG/DL
METANEPH 24H UR-MCNC: 108 UG/L
METANEPH 24H UR-MRATE: 275 UG/D
METANEPH+NORMETANEPH UR-IMP: NORMAL
METANEPH/CREAT 24H UR: 161 UG/G CRT
NORMETANEPHRINE 24H UR-MCNC: 182 UG/L
NORMETANEPHRINE 24H UR-MRATE: 464 UG/D
NORMETANEPHRINE/CREAT 24H UR: 272 UG/G CRT

## 2025-05-07 ENCOUNTER — OFFICE VISIT (OUTPATIENT)
Dept: CARDIOLOGY | Facility: CLINIC | Age: 45
End: 2025-05-07
Payer: COMMERCIAL

## 2025-05-07 VITALS
HEIGHT: 67 IN | BODY MASS INDEX: 24.66 KG/M2 | SYSTOLIC BLOOD PRESSURE: 152 MMHG | HEART RATE: 88 BPM | WEIGHT: 157.1 LBS | DIASTOLIC BLOOD PRESSURE: 82 MMHG

## 2025-05-07 DIAGNOSIS — I10 HYPERTENSION, UNSPECIFIED TYPE: ICD-10-CM

## 2025-05-07 PROCEDURE — 3077F SYST BP >= 140 MM HG: CPT | Performed by: NURSE PRACTITIONER

## 2025-05-07 PROCEDURE — 99214 OFFICE O/P EST MOD 30 MIN: CPT | Performed by: NURSE PRACTITIONER

## 2025-05-07 PROCEDURE — 3079F DIAST BP 80-89 MM HG: CPT | Performed by: NURSE PRACTITIONER

## 2025-05-07 NOTE — LETTER
"5/7/2025    [facility] [address] [place] [address]    RE: Philipp Sarmientoyordan       Dear Colleague,     I had the pleasure of seeing Philipp Gupta in the [facility].  HISTORY OF PRESENT ILLNESS:    This is a 45 year old male who follows with Dr Rollins at [facility]  His past medical history includes:  Hypertension, cervical radicular pain    Mr Gupta has a longstanding history of hypertension  Prior screening for secondary causes for hyperaldosteronism and pheochromocytoma were negative  ECHO (2021) showed preserved LVEF, normal LV wall thickness, mild ascending aorta dilatation     He had reported that several medications were ineffective and had been off all antihypertensive agents for a period of time  Subsequently, he was seen in the ED (12/2024) for hypertensive urgency and Losartan was initiated     When seen in clinic (1/2025) he was still quite hypertensive and he was started on Amlodipine and Spironolactone He was no longer taking Losartan  A ECHO, and labs were arranged    Labs showed elevated aldosterone level, epinephrine, norepinephrine levels  He has been referred to Endocrinology to discuss this further       ECHO (4/16/25) showed LVEF 60% with normal wall thickness, normal RV function, no valvular pathology, ascending aorta 3.8 cm    Mr Gupta is active at his job and is on his feet at work every day  He denies any chest pain, shortness of breath, palpitations, orthopnea, or peripheral edema  He feels that he is tolerating the new medications. We talked about that his blood pressures have improved but not at goal  He refuses to add an additional medication today and prefers to continue current medical regimen and follow up with the Endocrinologist   He denies any headaches or blurred vision      BP (!) 152/82   Pulse 88   Ht 1.702 m (5' 7\")   Wt 71.3 kg (157 lb 1.6 oz)   BMI 24.61 kg/m       IMPRESSION AND PLAN:    Severe " longstanding Hypertension:  -on Amlodipine 10 mg, Spironolactone 100 mg  -BP elevated, but improved  -deferred trying Losartan  He agreed to continue with Amlodipine and Spironolactone  -follow up planned with Endocrinology due to elevated Aldosterone and plasma catecholamines  -consider renal artery ultrasound to rule out renal artery stenosis    Elevated Glucose  -encouraged reducing sugars in diet    The total time for the visit today was 30 minutes which includes patient visit, reviewing of records, discussion, and placing of orders of the outpatient coordination of cardiovascular care as described.  The level of medical decision making during this visit was of moderate complexity.  Thank you for allowing me to participate in their care.    The longitudinal plan of care for the diagnosis(es)/condition(s) as documented were addressed during this visit. Due to the added complexity in care, I will continue to support Philipp in the subsequent management and with ongoing continuity of care.      Orders Placed This Encounter   Procedures     Follow-Up with Cardiology       No orders of the defined types were placed in this encounter.      There are no discontinued medications.      Encounter Diagnosis   Name Primary?     Hypertension, unspecified type        CURRENT MEDICATIONS:  Current Outpatient Medications   Medication Sig Dispense Refill     amLODIPine (NORVASC) 10 MG tablet Take 1 tablet (10 mg) by mouth daily. 90 tablet 11     spironolactone (ALDACTONE) 100 MG tablet Take 1 tablet (100 mg) by mouth daily. 90 tablet 11     hydrocortisone, Perianal, (HYDROCORTISONE) 2.5 % cream Place rectally 2 times daily as needed for hemorrhoids. (Patient not taking: Reported on 5/7/2025) 30 g 1     naproxen (NAPROSYN) 500 MG tablet Take 1 tablet (500 mg) by mouth 2 times daily (with meals). (Patient not taking: Reported on 5/7/2025) 20 tablet 0     naproxen (NAPROSYN) 500 MG tablet Take 1 tablet (500 mg) by mouth 2 times daily  "(with meals). (Patient not taking: Reported on 5/7/2025) 60 tablet 0       ALLERGIES   No Known Allergies    PAST MEDICAL HISTORY:  Past Medical History:   Diagnosis Date     Hypertension        PAST SURGICAL HISTORY:  No past surgical history on file.    FAMILY HISTORY:  Family History   Problem Relation Age of Onset     Glaucoma No family hx of      Macular Degeneration No family hx of        SOCIAL HISTORY:  Social History     Socioeconomic History     Marital status: Single     Spouse name: None     Number of children: None     Years of education: None     Highest education level: None   Tobacco Use     Smoking status: Never     Smokeless tobacco: Never   Vaping Use     Vaping status: Never Used   Substance and Sexual Activity     Alcohol use: No     Drug use: No     Sexual activity: Not Currently     Partners: Female     Social Drivers of Health     Interpersonal Safety: Low Risk  (1/6/2025)    Interpersonal Safety      Do you feel physically and emotionally safe where you currently live?: Yes      Within the past 12 months, have you been hit, slapped, kicked or otherwise physically hurt by someone?: No      Within the past 12 months, have you been humiliated or emotionally abused in other ways by your partner or ex-partner?: No       Review of Systems:  Skin:  not assessed       Eyes:  not assessed      ENT:  not assessed      Respiratory:  Negative       Cardiovascular:  Negative      Gastroenterology: not assessed      Genitourinary:  not assessed      Musculoskeletal:  not assessed      Neurologic:  not assessed      Psychiatric:  not assessed      Heme/Lymph/Imm:  not assessed      Endocrine:  not assessed        Physical Exam:  Vitals: BP (!) 152/82   Pulse 88   Ht 1.702 m (5' 7\")   Wt 71.3 kg (157 lb 1.6 oz)   BMI 24.61 kg/m      Constitutional:  cooperative thin      Skin:  warm and dry to the touch          Head:           Eyes:  pupils equal and round        Lymph:      ENT:           Neck:  JVP " normal        Respiratory:  clear to auscultation         Cardiac: regular rhythm                pulses full and equal                                        GI:           Extremities and Muscular Skeletal:  no edema              Neurological:  affect appropriate        Psych:  Alert and Oriented x 3          CC  Cole Rollins MD  [address]  [place] [id number]                      Thank you for allowing me to participate in the care of your patient.      Sincerely,     KRYSTLE Moore [facility]  cc:   Cole Rollins MD  [address]  [place] [id number]

## 2025-07-09 ENCOUNTER — PRE VISIT (OUTPATIENT)
Dept: ENDOCRINOLOGY | Facility: CLINIC | Age: 45
End: 2025-07-09

## 2025-07-09 ENCOUNTER — TELEPHONE (OUTPATIENT)
Dept: ENDOCRINOLOGY | Facility: CLINIC | Age: 45
End: 2025-07-09

## 2025-07-09 NOTE — TELEPHONE ENCOUNTER
Delores Munoz MD  [id number]  This new patient was not seen today. Please offer add-on at 3:30 PM on August 6, 2025.    Marie Fowler on 7/9/2025 at 2:51 PM

## 2025-07-10 NOTE — TELEPHONE ENCOUNTER
Patient confirmed scheduled appointment:  Date: 08/06/25   Time: 3:30  Visit type: NEW ENDOCRINE  Provider: Delores Munoz MD  Location: [facility]  Testing/imaging: N/A  Additional notes: Scheduled per pt. Pt repeated that no one told him what this appt was for or why he needs it. Caller explained again that he was referred by his cardiologist for concerns of elevated hormone levels that can cause hypertension. Provider asked for AM appt, as his shift starts at 3:30. Offered next available 3:00 opening in October, but pt will be in [place]. Pt elected to schedule 08/06 appt and he will ask to get time off work.    Marie Fowler on 7/10/2025 at 12:01 PM